# Patient Record
Sex: FEMALE | Race: WHITE | NOT HISPANIC OR LATINO | Employment: FULL TIME | URBAN - METROPOLITAN AREA
[De-identification: names, ages, dates, MRNs, and addresses within clinical notes are randomized per-mention and may not be internally consistent; named-entity substitution may affect disease eponyms.]

---

## 2017-05-25 ENCOUNTER — ALLSCRIPTS OFFICE VISIT (OUTPATIENT)
Dept: OTHER | Facility: OTHER | Age: 37
End: 2017-05-25

## 2017-05-25 DIAGNOSIS — Z12.31 ENCOUNTER FOR SCREENING MAMMOGRAM FOR MALIGNANT NEOPLASM OF BREAST: ICD-10-CM

## 2017-05-31 LAB
HPV 18 (HISTORICAL): NOT DETECTED
HPV HIGH RISK 16/18 (HISTORICAL): NOT DETECTED
HPV16 (HISTORICAL): NOT DETECTED
PAP (HISTORICAL): NORMAL

## 2018-01-13 VITALS
DIASTOLIC BLOOD PRESSURE: 88 MMHG | BODY MASS INDEX: 30.02 KG/M2 | HEIGHT: 61 IN | SYSTOLIC BLOOD PRESSURE: 126 MMHG | WEIGHT: 159 LBS

## 2018-06-07 ENCOUNTER — ANNUAL EXAM (OUTPATIENT)
Dept: OBGYN CLINIC | Facility: CLINIC | Age: 38
End: 2018-06-07
Payer: COMMERCIAL

## 2018-06-07 VITALS
WEIGHT: 160 LBS | DIASTOLIC BLOOD PRESSURE: 90 MMHG | SYSTOLIC BLOOD PRESSURE: 140 MMHG | BODY MASS INDEX: 30.21 KG/M2 | HEIGHT: 61 IN

## 2018-06-07 DIAGNOSIS — Z12.39 BREAST CANCER SCREENING: ICD-10-CM

## 2018-06-07 DIAGNOSIS — Z01.419 ENCNTR FOR GYN EXAM (GENERAL) (ROUTINE) W/O ABN FINDINGS: Primary | ICD-10-CM

## 2018-06-07 PROCEDURE — S0612 ANNUAL GYNECOLOGICAL EXAMINA: HCPCS | Performed by: NURSE PRACTITIONER

## 2018-06-07 NOTE — PROGRESS NOTES
Assessment/Plan   Diagnoses and all orders for this visit:    Encntr for gyn exam (general) (routine) w/o abn findings  -     GP Liquid-Based Pap + HPV Plus    Breast cancer screening  -     Mammo screening bilateral w cad; Future    Other orders  -     Etonogestrel (NEXPLANON) 76 MG IMPL; Inject 68 mg under the skin continuous        Discussion    Reviewed with patient normal exam today  Pap with HPV done today  Normal breast exam today  Monthly SBEs advised  Rx for mammogram given today  Vitamin D and Calcim Supplements advised  Exercise most days of the week  Follow with PCP for regular check-ups and blood work  RTO 1 year for annual or sooner as needed  Subjective     Humberto Arthur is a 42 y o  female who presents for annual well woman exam    Last exam 17 Pap Normal HPV negative had abnormal in past and colposcopy which led to insufficient tissue, but repeat pap was then normal  Pap guidelines reviewed with patient  Pt would like Pap today  Pt denies any abnormal vaginal discharge, itching, or odor  Pt in a mutually exclusive relationship with a male partner and denies the need for STD testing today  Menstrual Cycle:  LMP: 2-3 years ago  No menses since on Nexplanon  OB History      Para Term  AB Living    1       1      SAB TAB Ectopic Multiple Live Births    1           Contraception: Nexplanon placed 2016 would like another one this upcoming January  Reviewed with patient to call when closer to January and have insurance form filled out to check coverage and then can set up appointment for removal and insertion in the same day  Practices monthly SBEs, no breast complaints today  Last Mammogram Never had one  Denies any bowel or bladder issues  Pt follows with PCP for regular check-ups and blood work  Review of Systems   All other systems reviewed and are negative        The following portions of the patient's history were reviewed and updated as appropriate: allergies, current medications, past family history, past medical history, past social history, past surgical history and problem list       History reviewed  No pertinent past medical history  History reviewed  No pertinent surgical history  Family History   Problem Relation Age of Onset    Hypertension Mother     Osteopenia Mother     Hypertension Father     Hyperlipidemia Father     Lung cancer Father     Hypertension Maternal Grandmother     Hyperlipidemia Maternal Grandmother     Diabetes Maternal Grandmother     Diabetes Maternal Grandfather     Breast cancer Maternal Aunt        Social History     Social History    Marital status: Single     Spouse name: N/A    Number of children: N/A    Years of education: N/A     Occupational History    Not on file  Social History Main Topics    Smoking status: Never Smoker    Smokeless tobacco: Never Used    Alcohol use Yes      Comment: socially    Drug use: No    Sexual activity: Yes     Partners: Male     Birth control/ protection: Condom Male, Implant     Other Topics Concern    Not on file     Social History Narrative    No narrative on file         Current Outpatient Prescriptions:     Etonogestrel (NEXPLANON) 68 MG IMPL, Inject 68 mg under the skin continuous, Disp: , Rfl:     No Known Allergies    Objective   Vitals:    06/07/18 1620   BP: 140/90   BP Location: Left arm   Patient Position: Sitting   Cuff Size: Standard   Weight: 72 6 kg (160 lb)   Height: 5' 1" (1 549 m)     Physical Exam   Constitutional: She is oriented to person, place, and time  She appears well-developed and well-nourished  HENT:   Head: Normocephalic  Neck: Normal range of motion  Neck supple  No tracheal deviation present  No thyromegaly present  Cardiovascular: Normal rate, regular rhythm and normal heart sounds      Pulmonary/Chest: Effort normal and breath sounds normal  Right breast exhibits no inverted nipple, no mass, no nipple discharge, no skin change and no tenderness  Left breast exhibits no inverted nipple, no mass, no nipple discharge, no skin change and no tenderness  Breasts are symmetrical    Abdominal: Soft  Bowel sounds are normal  She exhibits no distension and no mass  There is no tenderness  There is no rebound and no guarding  Genitourinary: Vagina normal and uterus normal  No breast swelling, tenderness, discharge or bleeding  No labial fusion  There is no rash, tenderness, lesion or injury on the right labia  There is no rash, tenderness, lesion or injury on the left labia  Cervix exhibits no motion tenderness, no discharge and no friability  Right adnexum displays no mass, no tenderness and no fullness  Left adnexum displays no mass, no tenderness and no fullness  Musculoskeletal: Normal range of motion  Neurological: She is alert and oriented to person, place, and time  Skin: Skin is warm and dry  Psychiatric: She has a normal mood and affect   Her behavior is normal  Judgment and thought content normal

## 2018-06-13 LAB
HPV HR 12 DNA CVX QL NAA+PROBE: NOT DETECTED
HPV16 DNA SPEC QL NAA+PROBE: NOT DETECTED
HPV18 DNA SPEC QL NAA+PROBE: NOT DETECTED
THIN PREP CVX: NORMAL

## 2018-07-10 ENCOUNTER — TELEPHONE (OUTPATIENT)
Dept: OBGYN CLINIC | Facility: CLINIC | Age: 38
End: 2018-07-10

## 2018-08-22 ENCOUNTER — TELEPHONE (OUTPATIENT)
Dept: OBGYN CLINIC | Facility: CLINIC | Age: 38
End: 2018-08-22

## 2018-09-24 ENCOUNTER — TELEPHONE (OUTPATIENT)
Dept: OBGYN CLINIC | Facility: CLINIC | Age: 38
End: 2018-09-24

## 2018-09-24 NOTE — TELEPHONE ENCOUNTER
Has question regarding Nexplanon   Needs to have removed in January and would like a new one for replacement

## 2018-10-01 ENCOUNTER — TELEPHONE (OUTPATIENT)
Dept: OBGYN CLINIC | Facility: CLINIC | Age: 38
End: 2018-10-01

## 2018-10-04 ENCOUNTER — TRANSCRIBE ORDERS (OUTPATIENT)
Dept: ADMINISTRATIVE | Facility: HOSPITAL | Age: 38
End: 2018-10-04

## 2018-10-04 ENCOUNTER — HOSPITAL ENCOUNTER (OUTPATIENT)
Dept: RADIOLOGY | Facility: HOSPITAL | Age: 38
Discharge: HOME | End: 2018-10-04
Payer: COMMERCIAL

## 2018-10-04 DIAGNOSIS — Z12.39 BREAST CANCER SCREENING: ICD-10-CM

## 2018-10-04 PROCEDURE — 77067 SCR MAMMO BI INCL CAD: CPT

## 2018-11-06 ENCOUNTER — TELEPHONE (OUTPATIENT)
Dept: OBGYN CLINIC | Facility: CLINIC | Age: 38
End: 2018-11-06

## 2018-11-08 ENCOUNTER — TELEPHONE (OUTPATIENT)
Dept: OBGYN CLINIC | Facility: CLINIC | Age: 38
End: 2018-11-08

## 2018-11-10 ENCOUNTER — TELEPHONE (OUTPATIENT)
Dept: LABOR AND DELIVERY | Facility: HOSPITAL | Age: 38
End: 2018-11-10

## 2018-11-10 NOTE — TELEPHONE ENCOUNTER
Phone call to patient  Reviewed with patient device Nexplanon function, risks, benefits, and placement procedure  Patient reports understanding and consent with no further questions  Patient desires to proceed and will follow up at appointment  Patient has prior and will have removal and replacement

## 2018-11-16 ENCOUNTER — TELEPHONE (OUTPATIENT)
Dept: OBGYN CLINIC | Facility: CLINIC | Age: 38
End: 2018-11-16

## 2018-11-24 ENCOUNTER — OFFICE VISIT (OUTPATIENT)
Dept: FAMILY MEDICINE CLINIC | Facility: CLINIC | Age: 38
End: 2018-11-24
Payer: COMMERCIAL

## 2018-11-24 VITALS
SYSTOLIC BLOOD PRESSURE: 158 MMHG | HEIGHT: 61 IN | TEMPERATURE: 97.5 F | DIASTOLIC BLOOD PRESSURE: 98 MMHG | WEIGHT: 167 LBS | BODY MASS INDEX: 31.53 KG/M2 | HEART RATE: 96 BPM | RESPIRATION RATE: 20 BRPM

## 2018-11-24 DIAGNOSIS — H10.31 ACUTE BACTERIAL CONJUNCTIVITIS OF RIGHT EYE: ICD-10-CM

## 2018-11-24 DIAGNOSIS — J01.00 ACUTE NON-RECURRENT MAXILLARY SINUSITIS: Primary | ICD-10-CM

## 2018-11-24 PROCEDURE — 99213 OFFICE O/P EST LOW 20 MIN: CPT | Performed by: FAMILY MEDICINE

## 2018-11-24 PROCEDURE — 1036F TOBACCO NON-USER: CPT | Performed by: FAMILY MEDICINE

## 2018-11-24 PROCEDURE — 3008F BODY MASS INDEX DOCD: CPT | Performed by: FAMILY MEDICINE

## 2018-11-24 RX ORDER — CEFPODOXIME PROXETIL 100 MG/1
100 TABLET, FILM COATED ORAL 2 TIMES DAILY
Qty: 14 TABLET | Refills: 0 | Status: SHIPPED | OUTPATIENT
Start: 2018-11-24 | End: 2018-12-01

## 2018-11-24 RX ORDER — CEFPODOXIME PROXETIL 100 MG/1
100 TABLET, FILM COATED ORAL 2 TIMES DAILY
Qty: 14 TABLET | Refills: 0 | Status: SHIPPED | OUTPATIENT
Start: 2018-11-24 | End: 2018-11-24 | Stop reason: SDUPTHER

## 2018-11-24 RX ORDER — SULFACETAMIDE SODIUM 100 MG/ML
1 SOLUTION/ DROPS OPHTHALMIC
Qty: 15 ML | Refills: 0 | Status: SHIPPED | OUTPATIENT
Start: 2018-11-24 | End: 2018-12-19 | Stop reason: ALTCHOICE

## 2018-11-24 NOTE — PROGRESS NOTES
Subjective:           Problem List Items Addressed This Visit     None      Visit Diagnoses     Acute non-recurrent maxillary sinusitis    -  Primary    Relevant Medications    cefpodoxime (VANTIN) 100 mg tablet    Acute bacterial conjunctivitis of right eye        Relevant Medications    cefpodoxime (VANTIN) 100 mg tablet    sulfacetamide (BLEPH-10) 10 % ophthalmic solution              No orders of the defined types were placed in this encounter  Patient Instructions   Afrin spray 1 daily x 3    xyzal 5mg daily for congestion  f/u if worsens      Francella Lower      HPI URI sinus congestion past few days R eye red    Vitals:    11/24/18 0939   BP: 158/98   Pulse: 96   Resp: 20   Temp: 97 5 °F (36 4 °C)       No Known Allergies    Current Outpatient Prescriptions on File Prior to Visit   Medication Sig Dispense Refill    Etonogestrel (NEXPLANON) 68 MG IMPL Inject 68 mg under the skin continuous       No current facility-administered medications on file prior to visit  No past medical history on file  No past surgical history on file  reports that she has never smoked  She has never used smokeless tobacco  She reports that she drinks alcohol  She reports that she does not use drugs  reports that she has never smoked  She has never used smokeless tobacco     The following portions of the patient's history were reviewed and updated as appropriate: allergies, current medications, past family history, past medical history, past social history, past surgical history and problem list     Review of Systems   Constitutional: Negative for fatigue and fever  HENT: Positive for postnasal drip, sinus pain and sinus pressure  Negative for voice change  Eyes: Positive for redness  Respiratory: Negative for chest tightness and shortness of breath  Cardiovascular: Negative for chest pain and palpitations  Genitourinary: Negative for difficulty urinating and hematuria     Neurological: Negative for numbness  Hematological: Negative for adenopathy  Psychiatric/Behavioral: Negative for agitation and confusion  Physical Exam   Constitutional: She appears well-developed and well-nourished  HENT:   Head: Normocephalic  Mucoid sinus   Eyes: Pupils are equal, round, and reactive to light  EOM are normal  Right eye exhibits no discharge  No scleral icterus  Injected R   Neck: No thyromegaly present  Pulmonary/Chest: Effort normal and breath sounds normal    Abdominal: Soft  Bowel sounds are normal    Neurological: She is alert  No cranial nerve deficit  Skin: Skin is warm  Capillary refill takes less than 2 seconds  Psychiatric: She has a normal mood and affect

## 2018-11-28 ENCOUNTER — PROCEDURE VISIT (OUTPATIENT)
Dept: OBGYN CLINIC | Facility: CLINIC | Age: 38
End: 2018-11-28
Payer: COMMERCIAL

## 2018-11-28 VITALS
HEIGHT: 61 IN | SYSTOLIC BLOOD PRESSURE: 142 MMHG | WEIGHT: 162 LBS | BODY MASS INDEX: 30.58 KG/M2 | DIASTOLIC BLOOD PRESSURE: 98 MMHG

## 2018-11-28 DIAGNOSIS — Z30.017 NEXPLANON INSERTION: Primary | ICD-10-CM

## 2018-11-28 DIAGNOSIS — Z30.46 NEXPLANON REMOVAL: ICD-10-CM

## 2018-11-28 DIAGNOSIS — Z30.017 ENCOUNTER FOR INITIAL PRESCRIPTION OF IMPLANTABLE SUBDERMAL CONTRACEPTIVE: ICD-10-CM

## 2018-11-28 LAB — SL AMB POCT URINE HCG: NEGATIVE

## 2018-11-28 PROCEDURE — 11983 REMOVE/INSERT DRUG IMPLANT: CPT | Performed by: NURSE PRACTITIONER

## 2018-11-28 PROCEDURE — 81025 URINE PREGNANCY TEST: CPT | Performed by: NURSE PRACTITIONER

## 2018-11-29 NOTE — PROGRESS NOTES
Remove and insert drug implant  Date/Time: 11/28/2018 10:04 PM  Performed by: Haider Voss  Authorized by: Haider Voss     Consent:     Consent obtained:  Verbal and written    Consent given by:  Patient    Procedural risks discussed:  Bleeding, damage to other organs, failure rate, infection, possible continued pain, possible conversion to open surgery, possible loss of function and repeat procedure    Patient questions answered: yes      Patient agrees, verbalizes understanding, and wants to proceed: yes      Educational handouts given: yes      Instructions and paperwork completed: yes    Indication:     Indication: Presence of non-biodegradable drug delivery implant    Pre-procedure:     Pre-procedure timeout performed: yes      Prepped with: povidone-iodine      Local anesthetic:  Lidocaine 1%    The site was cleaned and prepped in a sterile fashion: yes    Procedure:     Procedure:  Removal with reinsertion    Small stab incision was made in arm: yes      Left/right:  Right    Preloaded Implanon trocar was placed subdermally: yes      Visualization of implant was obtained: yes      Implanon was inserted and trocar removed: yes      Visualization of notch in stilette and palpitation of device: yes      Palpitation confirms placement by provider and patient: yes      Site was closed with steri-strips and pressure bandage applied: yes    Comments:      Old nexplanon removed intact without difficulty  New Nexplanon placed in right arm without difficulty  No heavy lifting with right arm for 24-48 hours  May remove bandage after 24 hours  Call with any questions

## 2018-12-19 ENCOUNTER — OFFICE VISIT (OUTPATIENT)
Dept: FAMILY MEDICINE CLINIC | Facility: CLINIC | Age: 38
End: 2018-12-19
Payer: COMMERCIAL

## 2018-12-19 ENCOUNTER — TELEPHONE (OUTPATIENT)
Dept: FAMILY MEDICINE CLINIC | Facility: CLINIC | Age: 38
End: 2018-12-19

## 2018-12-19 VITALS
RESPIRATION RATE: 16 BRPM | BODY MASS INDEX: 31.53 KG/M2 | DIASTOLIC BLOOD PRESSURE: 90 MMHG | HEART RATE: 76 BPM | HEIGHT: 61 IN | WEIGHT: 167 LBS | SYSTOLIC BLOOD PRESSURE: 140 MMHG | TEMPERATURE: 98.8 F

## 2018-12-19 DIAGNOSIS — H10.32 ACUTE CONJUNCTIVITIS OF LEFT EYE, UNSPECIFIED ACUTE CONJUNCTIVITIS TYPE: Primary | ICD-10-CM

## 2018-12-19 PROCEDURE — 99213 OFFICE O/P EST LOW 20 MIN: CPT | Performed by: NURSE PRACTITIONER

## 2018-12-19 PROCEDURE — 1036F TOBACCO NON-USER: CPT | Performed by: NURSE PRACTITIONER

## 2018-12-19 PROCEDURE — 3008F BODY MASS INDEX DOCD: CPT | Performed by: NURSE PRACTITIONER

## 2018-12-19 RX ORDER — TOBRAMYCIN AND DEXAMETHASONE 3; 1 MG/ML; MG/ML
1 SUSPENSION/ DROPS OPHTHALMIC
Qty: 10 ML | Refills: 0 | Status: SHIPPED | OUTPATIENT
Start: 2018-12-19 | End: 2020-11-25 | Stop reason: ALTCHOICE

## 2018-12-19 RX ORDER — DEXAMETHASONE SODIUM PHOSPHATE 1 MG/ML
1 SOLUTION/ DROPS OPHTHALMIC
Qty: 5 ML | Refills: 0 | Status: CANCELLED | OUTPATIENT
Start: 2018-12-19

## 2018-12-19 NOTE — TELEPHONE ENCOUNTER
As long as she does 1 drop every 4 hrs the total amount given is not important    She should treat the eye for 7 days

## 2018-12-19 NOTE — PROGRESS NOTES
Assessment/Plan:     Diagnoses and all orders for this visit:    Acute conjunctivitis of left eye, unspecified acute conjunctivitis type  -     tobramycin-dexamethasone (TOBRADEX) ophthalmic suspension; Administer 1 drop into the left eye every 4 (four) hours while awake    BMI 31 0-31 9,adult    Other orders  -     Cancel: dexamethasone sodium phosphate 0 1 % ophthalmic solution; 1 drop every 3 (three) hours            Patient Instructions:  Warm compresses at the eye  Supportive care discussed and advised  Follow up for no improvement and worsening of conditions  Patient advised and educated when to see immediate medical care  Return if symptoms worsen or fail to improve  Subjective:      Patient ID: Nazia Simons is a 34 y o  female  Chief Complaint   Patient presents with   [id number] [facility]       HPI  Patient stated that started with redness in left eye with yellowish discharge in the morning  Denies fever,chills and vision changes  Taking zyrtec for congestion and feeling better  The following portions of the patient's history were reviewed and updated as appropriate: allergies, current medications, past family history, past medical history, past social history, past surgical history and problem list       Review of Systems   Constitutional: Negative for chills, fatigue and fever  HENT: Negative for congestion, ear discharge, ear pain, facial swelling, hearing loss, mouth sores, nosebleeds, postnasal drip, rhinorrhea, sinus pain, sinus pressure, sneezing, sore throat, trouble swallowing and voice change  Eyes: Positive for discharge and redness  Negative for pain, itching and visual disturbance  Respiratory: Negative for cough, chest tightness, shortness of breath and wheezing  Cardiovascular: Negative  Gastrointestinal: Negative for abdominal pain, constipation, diarrhea and nausea  Neurological: Negative for dizziness, weakness, light-headedness and headaches  Objective:    History   Smoking Status    Never Smoker   Smokeless Tobacco    Never Used       Allergies: No Known Allergies    Vitals:  /90   Pulse 76   Temp 98 8 °F (37 1 °C)   Resp 16   Ht 5' 1" (1 549 m)   Wt 75 8 kg (167 lb)   BMI 31 55 kg/m²     Current Outpatient Prescriptions   Medication Sig Dispense Refill    tobramycin-dexamethasone (TOBRADEX) ophthalmic suspension Administer 1 drop into the left eye every 4 (four) hours while awake 10 mL 0     No current facility-administered medications for this visit  Physical Exam   Constitutional: She is oriented to person, place, and time  She appears well-developed and well-nourished  HENT:   Head: Normocephalic  Right Ear: Tympanic membrane, external ear and ear canal normal    Left Ear: Tympanic membrane, external ear and ear canal normal    Nose: Nose normal  Right sinus exhibits no maxillary sinus tenderness and no frontal sinus tenderness  Left sinus exhibits no maxillary sinus tenderness and no frontal sinus tenderness  Mouth/Throat: Oropharynx is clear and moist and mucous membranes are normal    Eyes: Right conjunctiva is not injected  Left conjunctiva is injected  Neck: Neck supple  Cardiovascular: Normal rate, regular rhythm and normal heart sounds  Pulmonary/Chest: Effort normal and breath sounds normal    Abdominal: Normal appearance and bowel sounds are normal  There is no hepatosplenomegaly  There is no tenderness  There is no rebound  Musculoskeletal: Normal range of motion  Lymphadenopathy:        Right cervical: No superficial cervical and no posterior cervical adenopathy present  Left cervical: No superficial cervical and no posterior cervical adenopathy present  Neurological: She is alert and oriented to person, place, and time  Skin: Skin is warm and dry  Psychiatric: She has a normal mood and affect  Her behavior is normal  Judgment and thought content normal    Vitals reviewed  TALAT CarcamoNP

## 2018-12-19 NOTE — TELEPHONE ENCOUNTER
Patient  called back and stated that her and pharmacist found cupon for 5ml of TOBERDEX for $52     Can she take this amount? She said she was never told how much to take  Please call she is at Pharmacy    [phone]

## 2018-12-19 NOTE — TELEPHONE ENCOUNTER
Spoke with pt, she is aware and agreeable  No further action needed   Melvina Patel, [address]

## 2018-12-19 NOTE — TELEPHONE ENCOUNTER
Pt was here earlier and was told to call back if the prescription she sent to the pharmacy was too expensive and it is  It was $218, she also said that she was told someone else here tonight can recommend something cheaper and call it over tonight  To the [address] [place] [address] in [place]  Also please call the pt  To let her know

## 2018-12-19 NOTE — PATIENT INSTRUCTIONS
Warm compresses at the eye  Supportive care discussed and advised  Follow up for no improvement and worsening of conditions  Patient advised and educated when to see immediate medical care  Conjunctivitis   AMBULATORY CARE:   Conjunctivitis,  or pink eye, is inflammation of your conjunctiva  The conjunctiva is a thin tissue that covers the front of your eye and the back of your eyelids  The conjunctiva helps protect your eye and keep it moist  Conjunctivitis may be caused by bacteria, allergies, or a virus  If your conjunctivitis is caused by bacteria, it may get better on its own in about 7 days  Viral conjunctivitis can last up to 3 weeks  Common symptoms may include any of the following: You will usually have symptoms in both eyes if your conjunctivitis is caused by allergies  You may also have other allergic symptoms, such as a rash or runny nose  Symptoms will usually start in 1 eye if your conjunctivitis is caused by a virus or bacteria  · Redness in the whites of your eye    · Itching in your eye or around your eye    · Feeling like there is something in your eye    · Watery or thick, sticky discharge    · Crusty eyelids when you wake up in the morning    · Burning, stinging, or swelling in your eye    · Pain when you see bright light  Seek care immediately if:   · You have worsening eye pain  · The swelling in your eye gets worse, even after treatment  · Your vision suddenly becomes worse or you cannot see at all  Contact your healthcare provider if:   · You develop a fever and ear pain  · You have tiny bumps or spots of blood on your eye  · You have questions or concerns about your condition or care  Treatment  will depend on the cause of your conjunctivitis  You may need antibiotics or allergy medicine as a pill, eye drop, or eye ointment  Manage your symptoms:   · Apply a cool compress  Wet a washcloth with cold water and place it on your eye   This will help decrease itching and irritation  · Do not wear contact lenses  They can irritate your eye  Throw away the pair you are using and ask when you can wear them again  Use a new pair of lenses when your healthcare provider says it is okay  · Avoid irritants  Stay away from smoke filled areas  Shield your eyes from wind and sun  · Flush your eye  You may need to flush your eye with saline to help decrease your symptoms  Ask for more information on how to flush your eye  Medicines:  Treatment depends on what is causing your conjunctivitis  You may be given any of the following:  · Allergy medicine  helps decrease itchy, red, swollen eyes caused by allergies  It may be given as a pill, eye drops, or nasal spray  · Antibiotics  may be needed if your conjunctivitis is caused by bacteria  This medicine may be given as a pill, eye drops, or eye ointment  · Take your medicine as directed  Contact your healthcare provider if you think your medicine is not helping or if you have side effects  Tell him or her if you are allergic to any medicine  Keep a list of the medicines, vitamins, and herbs you take  Include the amounts, and when and why you take them  Bring the list or the pill bottles to follow-up visits  Carry your medicine list with you in case of an emergency  Prevent the spread of conjunctivitis:   · Wash your hands with soap and water often  Wash your hands before and after you touch your eyes  Also wash your hands before you prepare or eat food and after you use the bathroom or change a diaper  · Avoid allergens  Try to avoid the things that cause your allergies, such as pets, dust, or grass  · Avoid contact with others  Do not share towels or washcloths  Try to stay away from others as much as possible  Ask when you can return to work or school  · Throw away eye makeup  The bacteria that caused your conjunctivitis can stay in eye makeup  Throw away mascara and other eye makeup    © 2017 [facility] Information is for End User's use only and may not be sold, redistributed or otherwise used for commercial purposes  All illustrations and images included in CareNotes® are the copyrighted property of [employer] D [employer] M [employer]  or [employer]  The above information is an  only  It is not intended as medical advice for individual conditions or treatments  Talk to your doctor, nurse or pharmacist before following any medical regimen to see if it is safe and effective for you

## 2019-06-27 ENCOUNTER — ANNUAL EXAM (OUTPATIENT)
Dept: OBGYN CLINIC | Facility: CLINIC | Age: 39
End: 2019-06-27
Payer: COMMERCIAL

## 2019-06-27 VITALS
BODY MASS INDEX: 30.85 KG/M2 | SYSTOLIC BLOOD PRESSURE: 142 MMHG | HEIGHT: 61 IN | WEIGHT: 163.4 LBS | DIASTOLIC BLOOD PRESSURE: 102 MMHG

## 2019-06-27 DIAGNOSIS — Z01.419 ENCNTR FOR GYN EXAM (GENERAL) (ROUTINE) W/O ABN FINDINGS: Primary | ICD-10-CM

## 2019-06-27 DIAGNOSIS — Z12.39 BREAST CANCER SCREENING: ICD-10-CM

## 2019-06-27 PROCEDURE — S0612 ANNUAL GYNECOLOGICAL EXAMINA: HCPCS | Performed by: NURSE PRACTITIONER

## 2019-07-04 LAB — THIN PREP CVX: NORMAL

## 2019-10-10 LAB — HBA1C MFR BLD HPLC: 5.8 %

## 2019-11-13 ENCOUNTER — HOSPITAL ENCOUNTER (OUTPATIENT)
Dept: RADIOLOGY | Facility: HOSPITAL | Age: 39
Discharge: HOME | End: 2019-11-13
Payer: COMMERCIAL

## 2019-11-13 VITALS — HEIGHT: 61 IN | WEIGHT: 165 LBS | BODY MASS INDEX: 31.15 KG/M2

## 2019-11-13 DIAGNOSIS — Z12.39 BREAST CANCER SCREENING: ICD-10-CM

## 2019-11-13 PROCEDURE — 77067 SCR MAMMO BI INCL CAD: CPT

## 2019-11-13 PROCEDURE — 77063 BREAST TOMOSYNTHESIS BI: CPT

## 2020-11-25 ENCOUNTER — ANNUAL EXAM (OUTPATIENT)
Dept: OBGYN CLINIC | Facility: CLINIC | Age: 40
End: 2020-11-25
Payer: COMMERCIAL

## 2020-11-25 VITALS
WEIGHT: 176 LBS | SYSTOLIC BLOOD PRESSURE: 120 MMHG | DIASTOLIC BLOOD PRESSURE: 70 MMHG | BODY MASS INDEX: 33.25 KG/M2 | TEMPERATURE: 97.5 F

## 2020-11-25 DIAGNOSIS — Z12.31 ENCOUNTER FOR SCREENING MAMMOGRAM FOR MALIGNANT NEOPLASM OF BREAST: ICD-10-CM

## 2020-11-25 DIAGNOSIS — Z01.419 WELL WOMAN EXAM: Primary | ICD-10-CM

## 2020-11-25 PROCEDURE — S0612 ANNUAL GYNECOLOGICAL EXAMINA: HCPCS | Performed by: PHYSICIAN ASSISTANT

## 2020-11-25 PROCEDURE — G0145 SCR C/V CYTO,THINLAYER,RESCR: HCPCS | Performed by: PHYSICIAN ASSISTANT

## 2020-11-30 LAB
LAB AP GYN PRIMARY INTERPRETATION: NORMAL
Lab: NORMAL

## 2021-01-27 ENCOUNTER — HOSPITAL ENCOUNTER (OUTPATIENT)
Dept: RADIOLOGY | Facility: HOSPITAL | Age: 41
Discharge: HOME | End: 2021-01-27
Payer: COMMERCIAL

## 2021-01-27 VITALS — HEIGHT: 61 IN | WEIGHT: 162 LBS | BODY MASS INDEX: 30.58 KG/M2

## 2021-01-27 DIAGNOSIS — Z12.31 ENCOUNTER FOR SCREENING MAMMOGRAM FOR MALIGNANT NEOPLASM OF BREAST: ICD-10-CM

## 2021-01-27 PROCEDURE — 77063 BREAST TOMOSYNTHESIS BI: CPT

## 2021-01-27 PROCEDURE — 77067 SCR MAMMO BI INCL CAD: CPT

## 2021-05-19 ENCOUNTER — TELEPHONE (OUTPATIENT)
Dept: FAMILY MEDICINE CLINIC | Facility: CLINIC | Age: 41
End: 2021-05-19

## 2021-05-19 NOTE — TELEPHONE ENCOUNTER
Patient has not been seen since 2018  Please verify pcp  She will need a CPE   [address], [place]

## 2021-05-25 ENCOUNTER — OFFICE VISIT (OUTPATIENT)
Dept: FAMILY MEDICINE CLINIC | Facility: CLINIC | Age: 41
End: 2021-05-25
Payer: COMMERCIAL

## 2021-05-25 VITALS
HEART RATE: 74 BPM | SYSTOLIC BLOOD PRESSURE: 146 MMHG | TEMPERATURE: 99.3 F | HEIGHT: 61 IN | DIASTOLIC BLOOD PRESSURE: 96 MMHG | RESPIRATION RATE: 16 BRPM | WEIGHT: 165 LBS | BODY MASS INDEX: 31.15 KG/M2

## 2021-05-25 DIAGNOSIS — R03.0 ELEVATED BLOOD PRESSURE READING WITHOUT DIAGNOSIS OF HYPERTENSION: ICD-10-CM

## 2021-05-25 DIAGNOSIS — Z00.00 ROUTINE ADULT HEALTH MAINTENANCE: Primary | ICD-10-CM

## 2021-05-25 DIAGNOSIS — Z13.0 SCREENING FOR DEFICIENCY ANEMIA: ICD-10-CM

## 2021-05-25 DIAGNOSIS — Z23 NEED FOR VACCINATION: ICD-10-CM

## 2021-05-25 DIAGNOSIS — Z13.1 SCREENING FOR DIABETES MELLITUS: ICD-10-CM

## 2021-05-25 DIAGNOSIS — Z13.29 SCREENING FOR THYROID DISORDER: ICD-10-CM

## 2021-05-25 DIAGNOSIS — Z13.6 SCREENING FOR CARDIOVASCULAR CONDITION: ICD-10-CM

## 2021-05-25 PROCEDURE — 99396 PREV VISIT EST AGE 40-64: CPT | Performed by: NURSE PRACTITIONER

## 2021-05-25 PROCEDURE — 90715 TDAP VACCINE 7 YRS/> IM: CPT

## 2021-05-25 PROCEDURE — 3725F SCREEN DEPRESSION PERFORMED: CPT | Performed by: NURSE PRACTITIONER

## 2021-05-25 PROCEDURE — 90471 IMMUNIZATION ADMIN: CPT

## 2021-05-25 NOTE — PROGRESS NOTES
FAMILY PRACTICE HEALTH MAINTENANCE OFFICE VISIT  [facility]    NAME: Consuelo Meza  AGE: 41 y o  SEX: female  : 1980     DATE: 2021    Assessment and Plan     1  Routine adult health maintenance    2  Screening for cardiovascular condition  -     Lipid Panel with Direct LDL reflex; Future    3  Screening for diabetes mellitus  -     Comprehensive metabolic panel; Future    4  Screening for deficiency anemia  -     CBC; Future    5  Screening for thyroid disorder  -     TSH, 3rd generation with Free T4 reflex; Future    6  Need for vaccination  -     TDAP VACCINE GREATER THAN OR EQUAL TO 8YO IM    7  Elevated blood pressure reading without diagnosis of hypertension  Advised to check BP at home twice a day and will bring readings in office in 3 weeks at follow up and can come early if needed for any issues  · Patient Counseling:   · Nutrition: Stressed importance of a well balanced diet, moderation of sodium/saturated fat, caloric balance and sufficient intake of fiber  · Exercise: Stressed the importance of regular exercise with a goal of 150 minutes per week  · Dental Health: Discussed daily flossing and brushing and regular dental visits   · Sexuality: Discussed sexually transmitted infections, use of condoms and prevention of unintended pregnancy  · Alcohol Use:  Recommended moderation of alcohol intake  · Injury Prevention: Discussed Safety Belts, Safety Helmets, and Smoke Detectors    · Immunizations reviewed: See Orders  · Discussed benefits of:  Mammogram , Cervical Cancer screening and Screening labs   BMI Counseling: Body mass index is 31 18 kg/m²  Discussed with patient's BMI with her   The BMI is above normal  Nutrition recommendations include reducing portion sizes, decreasing overall calorie intake, 3-5 servings of fruits/vegetables daily, reducing fast food intake, consuming healthier snacks, decreasing soda and/or juice intake, moderation in carbohydrate intake, increasing intake of lean protein, reducing intake of saturated fat and trans fat and reducing intake of cholesterol  Exercise recommendations include exercising 3-5 times per week, joining a gym and strength training exercises  Return in about 1 year (around 5/25/2022) for Annual physical         Chief Complaint     Chief Complaint   Patient presents with    Physical Exam     [facility]       History of Present Illness     HPI    Well Adult Physical   Patient here for a comprehensive physical exam   Denies any concerns and complains  Under care of gynecologist and uptodate on pap, breast exam and mammogram    BP reading elevated in office but denies any h/o HTN and stated that in last couple of years at her screening at work, they told her that her BP is elevated as SBP is been 130's and DBP is in 90's but whenever got rechecked, it was normal  Denies any headache, dizziness and sob  Advised to check BP at home twice a day and will bring readings in office in 3 weeks at follow up and can come early if needed for any issues  Stated that one of maternal aunts had colon cancer in her 63's but otherwise no other history   Will start colonscopy at 46 and if insurance covers before at age of 37 the will start then        Diet and Physical Activity  Diet: well balanced diet  Exercise: occasionally      Depression Screen  PHQ-9 Depression Screening    PHQ-9:   Frequency of the following problems over the past two weeks:      Little interest or pleasure in doing things: 0 - not at all  Feeling down, depressed, or hopeless: 0 - not at all  PHQ-2 Score: 0          General Health  Hearing: Normal:  bilateral  Vision: no vision problems, most recent eye exam <1 year and wears glasses  Dental: regular dental visits    Reproductive Health  Follows with gynecologist      The following portions of the patient's history were reviewed and updated as appropriate: allergies, current medications, past family history, past medical history, past social history, past surgical history and problem list     Review of Systems     Review of Systems   Constitutional: Negative  HENT: Negative  Eyes: Negative  Respiratory: Negative  Cardiovascular: Negative  Gastrointestinal: Negative  Endocrine: Negative  Genitourinary: Negative  Musculoskeletal: Negative  Skin: Negative  Allergic/Immunologic: Negative  Neurological: Negative  Hematological: Negative  Psychiatric/Behavioral: Negative  Past Medical History     Past Medical History:   Diagnosis Date    Abnormal Pap smear of cervix 2017    Miscarriage 10/2013    Urogenital trichomoniasis 4/2014       Past Surgical History     History reviewed  No pertinent surgical history  Social History     Social History     Socioeconomic History    Marital status: Single     Spouse name: None    Number of children: None    Years of education: None    Highest education level: None   Occupational History    None   Social Needs    Financial resource strain: None    Food insecurity     Worry: None     Inability: None    Transportation needs     Medical: None     Non-medical: None   Tobacco Use    Smoking status: Never Smoker    Smokeless tobacco: Never Used   Substance and Sexual Activity    Alcohol use:  Yes     Alcohol/week: 1 0 standard drinks     Types: 1 Glasses of wine per week     Comment: socially    Drug use: No    Sexual activity: Yes     Partners: Male     Birth control/protection: Condom Male, Implant   Lifestyle    Physical activity     Days per week: None     Minutes per session: None    Stress: None   Relationships    Social connections     Talks on phone: None     Gets together: None     Attends Muslim service: None     Active member of club or organization: None     Attends meetings of clubs or organizations: None     Relationship status: None    Intimate partner violence     Fear of current or ex partner: None Emotionally abused: None     Physically abused: None     Forced sexual activity: None   Other Topics Concern    None   Social History Narrative    None       Family History     Family History   Problem Relation Age of Onset    Hypertension Mother     Osteopenia Mother     Hypertension Father     Hyperlipidemia Father     Lung cancer Father     Hypertension Maternal Grandmother     Hyperlipidemia Maternal Grandmother     Diabetes Maternal Grandmother     Diabetes Maternal Grandfather     Breast cancer Maternal Aunt 45    Hypertension Sister     No Known Problems Paternal Grandmother     Hypertension Sister     Colon cancer Maternal Aunt 61    No Known Problems Maternal Aunt     No Known Problems Maternal Aunt     No Known Problems Paternal Aunt     Diabetes Paternal Grandfather     Hyperlipidemia Paternal Grandfather        Current Medications       Current Outpatient Medications:     etonogestrel (NEXPLANON) subdermal implant, 68 mg by Subdermal route once, Disp: , Rfl:      Allergies     No Known Allergies    Objective     /96   Pulse 74   Temp 99 3 °F (37 4 °C)   Resp 16   Ht 5' 1" (1 549 m)   Wt 74 8 kg (165 lb)   BMI 31 18 kg/m²      Physical Exam  Vitals signs reviewed  Constitutional:       Appearance: Normal appearance  HENT:      Head: Normocephalic and atraumatic  Salivary Glands: Right salivary gland is not tender  Left salivary gland is not tender  Right Ear: Tympanic membrane, ear canal and external ear normal       Left Ear: Tympanic membrane, ear canal and external ear normal  There is no impacted cerumen  Nose: Nose normal  No congestion  Mouth/Throat:      Mouth: Mucous membranes are moist       Pharynx: No oropharyngeal exudate or posterior oropharyngeal erythema  Eyes:      General: Lids are normal          Right eye: No discharge or hordeolum  Left eye: No discharge or hordeolum        Conjunctiva/sclera: Conjunctivae normal  Right eye: Right conjunctiva is not injected  No exudate or hemorrhage  Left eye: Left conjunctiva is not injected  No exudate or hemorrhage  Pupils: Pupils are equal, round, and reactive to light  Neck:      Musculoskeletal: Full passive range of motion without pain, normal range of motion and neck supple  Thyroid: No thyromegaly or thyroid tenderness  Cardiovascular:      Rate and Rhythm: Normal rate and regular rhythm  Pulses: Normal pulses  Heart sounds: Normal heart sounds  Pulmonary:      Effort: Pulmonary effort is normal       Breath sounds: Normal breath sounds  Chest:      Chest wall: No deformity, swelling, tenderness, crepitus or edema  There is no dullness to percussion  Abdominal:      General: Abdomen is flat  Bowel sounds are normal       Palpations: Abdomen is soft  Tenderness: There is no abdominal tenderness  Hernia: There is no hernia in the left inguinal area or right inguinal area  Genitourinary:     Comments:  and breast deferred as follows up with gynecologist  Musculoskeletal: Normal range of motion  General: No swelling or tenderness  Right lower leg: No edema  Left lower leg: No edema  Lymphadenopathy:      Cervical:      Right cervical: No superficial or posterior cervical adenopathy  Left cervical: No superficial or posterior cervical adenopathy  Upper Body:      Right upper body: No supraclavicular or axillary adenopathy  Left upper body: No supraclavicular or axillary adenopathy  Lower Body: No right inguinal adenopathy  No left inguinal adenopathy  Skin:     General: Skin is warm and dry  Findings: No rash  Neurological:      General: No focal deficit present  Mental Status: She is alert and oriented to person, place, and time  Mental status is at baseline  GCS: GCS eye subscore is 4  GCS verbal subscore is 5  Motor: Motor function is intact        Coordination: Coordination is intact  Coordination normal       Deep Tendon Reflexes: Reflexes are normal and symmetric  Psychiatric:         Attention and Perception: Attention normal          Mood and Affect: Mood normal          Speech: Speech normal          Behavior: Behavior normal  Behavior is cooperative  Thought Content:  Thought content normal          Judgment: Judgment normal             Visual Acuity Screening    Right eye Left eye Both eyes   Without correction:      With correction: 20/20 20/15 20/15           [address]

## 2021-05-25 NOTE — PATIENT INSTRUCTIONS
Wellness Visit for Adults   WHAT YOU NEED TO KNOW:   What is a wellness visit? A wellness visit is when you see your healthcare provider to get screened for health problems  Your healthcare provider will also give you advice on how to stay healthy  Write down your questions so you remember to ask them  Ask your healthcare provider how often you should have a wellness visit  What happens at a wellness visit? Your healthcare provider will ask about your health, and your family history of health problems  This includes high blood pressure, heart disease, and cancer  He or she will ask if you have symptoms that concern you, if you smoke, and about your mood  You may also be asked about your intake of medicines, supplements, food, and alcohol  Any of the following may be done:  · Your weight  will be checked  Your height may also be checked so your body mass index (BMI) can be calculated  Your BMI shows if you are at a healthy weight  · Your blood pressure  and heart rate will be checked  Your temperature may also be checked  · Blood and urine tests  may be done  Blood tests may be done to check your cholesterol levels  Abnormal cholesterol levels increase your risk for heart disease and stroke  You may also need a blood or urine test to check for diabetes if you are at increased risk  Urine tests may be done to look for signs of an infection or kidney disease  · A physical exam  includes checking your heartbeat and lungs with a stethoscope  Your healthcare provider may also check your skin to look for sun damage  · Screening tests  may be recommended  A screening test is done to check for diseases that may not cause symptoms  The screening tests you may need depend on your age, gender, family history, and lifestyle habits  For example, colorectal screening may be recommended if you are 48years old or older  What screening tests do I need if I am a woman?    · A Pap smear  is used to screen for cervical cancer  Pap smears are usually done every 3 to 5 years depending on your age  You may need them more often if you have had abnormal Pap smear test results in the past  Ask your healthcare provider how often you should have a Pap smear  · A mammogram  is an x-ray of your breasts to screen for breast cancer  Experts recommend mammograms every 2 years starting at age 48 years  You may need a mammogram at age 52 years or younger if you have an increased risk for breast cancer  Talk to your healthcare provider about when you should start having mammograms and how often you need them  What vaccines might I need? · Get an influenza vaccine  every year  The influenza vaccine protects you from the flu  Several types of viruses cause the flu  The viruses change over time, so new vaccines are made each year  · Get a tetanus-diphtheria (Td) booster vaccine  every 10 years  This vaccine protects you against tetanus and diphtheria  Tetanus is a severe infection that may cause painful muscle spasms and lockjaw  Diphtheria is a severe bacterial infection that causes a thick covering in the back of your mouth and throat  · Get a human papillomavirus (HPV) vaccine  if you are female and aged 23 to 32 or male 23 to 24 and never received it  This vaccine protects you from HPV infection  HPV is the most common infection spread by sexual contact  HPV may also cause vaginal, penile, and anal cancers  · Get a pneumococcal vaccine  if you are aged 72 years or older  The pneumococcal vaccine is an injection given to protect you from pneumococcal disease  Pneumococcal disease is an infection caused by pneumococcal bacteria  The infection may cause pneumonia, meningitis, or an ear infection  · Get a shingles vaccine  if you are 60 or older, even if you have had shingles before  The shingles vaccine is an injection to protect you from the varicella-zoster virus  This is the same virus that causes chickenpox   Shingles is a painful rash that develops in people who had chickenpox or have been exposed to the virus  How can I eat healthy? My Plate is a model for planning healthy meals  It shows the types and amounts of foods that should go on your plate  Fruits and vegetables make up about half of your plate, and grains and protein make up the other half  A serving of dairy is included on the side of your plate  The amount of calories and serving sizes you need depends on your age, gender, weight, and height  Examples of healthy foods are listed below:  · Eat a variety of vegetables  such as dark green, red, and orange vegetables  You can also include canned vegetables low in sodium (salt) and frozen vegetables without added butter or sauces  · Eat a variety of fresh fruits , canned fruit in 100% juice, frozen fruit, and dried fruit  · Include whole grains  At least half of the grains you eat should be whole grains  Examples include whole-wheat bread, wheat pasta, brown rice, and whole-grain cereals such as oatmeal     · Eat a variety of protein foods such as seafood (fish and shellfish), lean meat, and poultry without skin (turkey and chicken)  Examples of lean meats include pork leg, shoulder, or tenderloin, and beef round, sirloin, tenderloin, and extra lean ground beef  Other protein foods include eggs and egg substitutes, beans, peas, soy products, nuts, and seeds  · Choose low-fat dairy products such as skim or 1% milk or low-fat yogurt, cheese, and cottage cheese  · Limit unhealthy fats  such as butter, hard margarine, and shortening  How much exercise do I need? Exercise at least 30 minutes per day on most days of the week  Some examples of exercise include walking, biking, dancing, and swimming  You can also fit in more physical activity by taking the stairs instead of the elevator or parking farther away from stores  Include muscle strengthening activities 2 days each week   Regular exercise provides many health benefits  It helps you manage your weight, and decreases your risk for type 2 diabetes, heart disease, stroke, and high blood pressure  Exercise can also help improve your mood  Ask your healthcare provider about the best exercise plan for you  What are some general health and safety guidelines I should follow? · Do not smoke  Nicotine and other chemicals in cigarettes and cigars can cause lung damage  Ask your healthcare provider for information if you currently smoke and need help to quit  E-cigarettes or smokeless tobacco still contain nicotine  Talk to your healthcare provider before you use these products  · Limit alcohol  A drink of alcohol is 12 ounces of beer, 5 ounces of wine, or 1½ ounces of liquor  · Lose weight, if needed  Being overweight increases your risk of certain health conditions  These include heart disease, high blood pressure, type 2 diabetes, and certain types of cancer  · Protect your skin  Do not sunbathe or use tanning beds  Use sunscreen with a SPF 15 or higher  Apply sunscreen at least 15 minutes before you go outside  Reapply sunscreen every 2 hours  Wear protective clothing, hats, and sunglasses when you are outside  · Drive safely  Always wear your seatbelt  Make sure everyone in your car wears a seatbelt  A seatbelt can save your life if you are in an accident  Do not use your cell phone when you are driving  This could distract you and cause an accident  Pull over if you need to make a call or send a text message  · Practice safe sex  Use latex condoms if are sexually active and have more than one partner  Your healthcare provider may recommend screening tests for sexually transmitted infections (STIs)  · Wear helmets, lifejackets, and protective gear  Always wear a helmet when you ride a bike or motorcycle, go skiing, or play sports that could cause a head injury  Wear protective equipment when you play sports   Wear a lifejacket when you are on a boat or doing water sports  CARE AGREEMENT:   You have the right to help plan your care  Learn about your health condition and how it may be treated  Discuss treatment options with your healthcare providers to decide what care you want to receive  You always have the right to refuse treatment  The above information is an  only  It is not intended as medical advice for individual conditions or treatments  Talk to your doctor, nurse or pharmacist before following any medical regimen to see if it is safe and effective for you  © Copyright [address] Information is for End User's use only and may not be sold, redistributed or otherwise used for commercial purposes   All illustrations and images included in CareNotes® are the copyrighted property of [employer]  or [address] [place] [address]

## 2021-06-16 ENCOUNTER — OFFICE VISIT (OUTPATIENT)
Dept: FAMILY MEDICINE CLINIC | Facility: CLINIC | Age: 41
End: 2021-06-16
Payer: COMMERCIAL

## 2021-06-16 VITALS
RESPIRATION RATE: 14 BRPM | OXYGEN SATURATION: 96 % | TEMPERATURE: 98.6 F | HEART RATE: 111 BPM | HEIGHT: 61 IN | SYSTOLIC BLOOD PRESSURE: 150 MMHG | WEIGHT: 166 LBS | DIASTOLIC BLOOD PRESSURE: 100 MMHG | BODY MASS INDEX: 31.34 KG/M2

## 2021-06-16 DIAGNOSIS — I10 ESSENTIAL HYPERTENSION: Primary | ICD-10-CM

## 2021-06-16 PROCEDURE — 99213 OFFICE O/P EST LOW 20 MIN: CPT | Performed by: NURSE PRACTITIONER

## 2021-06-16 PROCEDURE — 3008F BODY MASS INDEX DOCD: CPT | Performed by: NURSE PRACTITIONER

## 2021-06-16 PROCEDURE — 1036F TOBACCO NON-USER: CPT | Performed by: NURSE PRACTITIONER

## 2021-06-16 NOTE — PATIENT INSTRUCTIONS
Weight Management   WHAT YOU NEED TO KNOW:   Being overweight increases your risk of health conditions such as heart disease, high blood pressure, type 2 diabetes, and certain types of cancer  It can also increase your risk for osteoarthritis, sleep apnea, and other respiratory problems  Aim for a slow, steady weight loss  Even a small amount of weight loss can lower your risk of health problems  DISCHARGE INSTRUCTIONS:   How to lose weight safely:  A safe and healthy way to lose weight is to eat fewer calories and get regular exercise  · You can lose up about 1 pound a week by decreasing the number of calories you eat by 500 calories each day  You can decrease calories by eating smaller portion sizes or by cutting out high-calorie foods  Read labels to find out how many calories are in the foods you eat  · You can also burn calories with exercise such as walking, swimming, or biking  You will be more likely to keep weight off if you make these changes part of your lifestyle  Exercise at least 30 minutes per day on most days of the week  You can also fit in more physical activity by taking the stairs instead of the elevator or parking farther away from stores  Ask your healthcare provider about the best exercise plan for you  Healthy meal plan for weight management:  A healthy meal plan includes a variety of foods, contains fewer calories, and helps you stay healthy  A healthy meal plan includes the following:     · Eat whole-grain foods more often  A healthy meal plan should contain fiber  Fiber is the part of grains, fruits, and vegetables that is not broken down by your body  Whole-grain foods are healthy and provide extra fiber in your diet  Some examples of whole-grain foods are whole-wheat breads and pastas, oatmeal, brown rice, and bulgur  · Eat a variety of vegetables every day  Include dark, leafy greens such as spinach, kale, ivory greens, and mustard greens   Eat yellow and orange vegetables such as carrots, sweet potatoes, and winter squash  · Eat a variety of fruits every day  Choose fresh or canned fruit (canned in its own juice or light syrup) instead of juice  Fruit juice has very little or no fiber  · Eat low-fat dairy foods  Drink fat-free (skim) milk or 1% milk  Eat fat-free yogurt and low-fat cottage cheese  Try low-fat cheeses such as mozzarella and other reduced-fat cheeses  · Choose meat and other protein foods that are low in fat  Choose beans or other legumes such as split peas or lentils  Choose fish, skinless poultry (chicken or turkey), or lean cuts of red meat (beef or pork)  Before you cook meat or poultry, cut off any visible fat  · Use less fat and oil  Try baking foods instead of frying them  Add less fat, such as margarine, sour cream, regular salad dressing, and mayonnaise to foods  Eat fewer high-fat foods  Some examples of high-fat foods include french fries, doughnuts, ice cream, and cakes  · Eat fewer sweets  Limit foods and drinks that are high in sugar  This includes candy, cookies, regular soda, and sweetened drinks  Ways to decrease calories:   · Eat smaller portions  ? Use a small plate with smaller servings  ? Do not eat second helpings  ? When you eat at a restaurant, ask for a box and place half of your meal in the box before you eat  ? Share an entrée with someone else  · Replace high-calorie snacks with healthy, low-calorie snacks  ? Choose fresh fruit, vegetables, fat-free rice cakes, or air-popped popcorn instead of potato chips, nuts, or chocolate  ? Choose water or calorie-free drinks instead of soda or sweetened drinks  · Do not shop for groceries when you are hungry  You may be more likely to make unhealthy food choices  Take a grocery list of healthy foods and shop after you have eaten  · Eat regular meals  Do not skip meals  Skipping meals can lead to overeating later in the day   This can make it harder for you to lose weight  Eat a healthy snack in place of a meal if you do not have time to eat a regular meal  Talk with a dietitian to help you create a meal plan and schedule that is right for you  Other things to consider as you try to lose weight:   · Be aware of situations that may give you the urge to overeat, such as eating while watching television  Find ways to avoid these situations  For example, read a book, go for a walk, or do crafts  · Meet with a weight loss support group or friends who are also trying to lose weight  This may help you stay motivated to continue working on your weight loss goals  © Copyright [address] Information is for End User's use only and may not be sold, redistributed or otherwise used for commercial purposes  All illustrations and images included in CareNotes® are the copyrighted property of A D A M , Inc  or [facility]epeda   The above information is an  only  It is not intended as medical advice for individual conditions or treatments  Talk to your doctor, nurse or pharmacist before following any medical regimen to see if it is safe and effective for you  Hypertension   WHAT YOU NEED TO KNOW:   What is hypertension? Hypertension is high blood pressure  Your blood pressure is the force of your blood moving against the walls of your arteries  Hypertension causes your blood pressure to get so high that your heart has to work much harder than normal  This can damage your heart  The cause of hypertension may not be known  This is called essential or primary hypertension  Hypertension caused by another medical condition, such as kidney disease, is called secondary hypertension  What do I need to know about the stages of hypertension? · Normal blood pressure is 119/79 or lower   Your healthcare provider may only check your blood pressure each year if it stays at a normal level  · Elevated blood pressure is 120/79 to 129/79   This is sometimes called prehypertension  Your healthcare provider may suggest lifestyle changes to help lower your blood pressure to a normal level  He or she may then check it again in 3 to 6 months  · Stage 1 hypertension is 130/80  to 139/89   Your provider may recommend lifestyle changes, medication, and checks every 3 to 6 months until your blood pressure is controlled  · Stage 2 hypertension is 140/90 or higher   Your provider will recommend lifestyle changes and have you take 2 kinds of hypertension medicines  You will also need to have your blood pressure checked monthly until it is controlled  What increases my risk for hypertension? · Age older than 54 years (men) or 72 (women)    · Stress, or a family history of hypertension or heart disease    · Obesity, lack of exercise, or too many high-sodium foods    · Use of tobacco, alcohol, or illegal drugs    · A medical condition, such as diabetes, kidney disease, thyroid disease, or adrenal gland disorder    · Certain medicines, such as steroids or birth control pills    What are the signs and symptoms of hypertension? You may have no signs or symptoms, or you may have any of the following:  · Headache    · Blurred vision    · Chest pain    · Dizziness or weakness    · Trouble breathing    · Nosebleeds    How is hypertension diagnosed? Your healthcare provider will take your blood pressure at several visits  You may also need to check your blood pressure at home  The provider will examine you and ask what medicines you take  He or she will also ask if you have a family history of high blood pressure and about any health conditions you have  He or she will also check your blood pressure and weight and examine your heart, lungs, and eyes  You may need any of the following tests:  · An ambulatory blood pressure monitor (ABPM)  is a device that you wear  ABPM measures your blood pressure while you do your regular daily activities   It records your blood pressure every 15 to 30 minutes during the day  It also records your blood pressure every 15 minutes to 1 hour at night  The recorded blood pressures help your healthcare provider know if you have hypertension not seen at your appointment  · Blood tests  may help healthcare providers find the cause of your hypertension  Blood tests can also help find other health problems caused by hypertension  · Urine tests  will be done to check your kidney function  Kidney problems can increase your risk for hypertension  What medicines are used to treat hypertension? · Antihypertensives  may be used to help lower your blood pressure  Several kinds of medicines are available  Your healthcare provider will choose medicines based on the kind of hypertension you have  You may need more than one type of medicine  Take the medicine exactly as directed  · Diuretics  help decrease extra fluid that collects in your body  This will help lower your blood pressure  You may urinate more often while you take this medicine  · Cholesterol medicine  helps lower your cholesterol level  A low cholesterol level helps prevent heart disease and makes it easier to control your blood pressure  What can I do to manage hypertension? · Check your blood pressure at home  Avoid smoking, caffeine, and exercise at least 30 minutes before checking your blood pressure  Sit and rest for 5 minutes before you take your blood pressure  Extend your arm and support it on a flat surface  Your arm should be at the same level as your heart  Follow the directions that came with your blood pressure monitor  Check your blood pressure 2 times, 1 minute apart, before you take your medicine in the morning  Also check your blood pressure before your evening meal  Keep a record of your readings and bring it to your follow-up visits  Ask your healthcare provider what your blood pressure should be  · Manage any other health conditions you have  Health conditions such as diabetes can increase your risk for hypertension  Follow your healthcare provider's instructions and take all your medicines as directed  · Ask about all medicines  Certain medicines can increase your blood pressure  Examples include oral birth control pills, decongestants, herbal supplements, and NSAIDs, such as ibuprofen  Your healthcare provider can tell you which medicines are safe for you to take  This includes prescription and over-the-counter medicines  What lifestyle changes can I make to manage hypertension? · Limit sodium (salt) as directed  Too much sodium can affect your fluid balance  Check labels to find low-sodium or no-salt-added foods  Some low-sodium foods use potassium salts for flavor  Too much potassium can also cause health problems  Your healthcare provider will tell you how much sodium and potassium are safe for you to have in a day  He or she may recommend that you limit sodium to 2,300 mg a day  · Follow the meal plan recommended by your healthcare provider  A dietitian or your provider can give you more information on low-sodium plans or the DASH (Dietary Approaches to Stop Hypertension) eating plan  The DASH plan is low in sodium, unhealthy fats, and total fat  It is high in potassium, calcium, and fiber  · Exercise to maintain a healthy weight  Exercise at least 30 minutes per day, on most days of the week  This will help decrease your blood pressure  Ask your healthcare provider about the best exercise plan for you  · Decrease stress  This may help lower your blood pressure  Learn ways to relax, such as deep breathing or listening to music  · Limit alcohol as directed  Alcohol can increase your blood pressure  A drink of alcohol is 12 ounces of beer, 5 ounces of wine, or 1½ ounces of liquor  · Do not smoke    Nicotine and other chemicals in cigarettes and cigars can increase your blood pressure and also cause lung damage  Ask your healthcare provider for information if you currently smoke and need help to quit  E-cigarettes or smokeless tobacco still contain nicotine  Talk to your healthcare provider before you use these products  Call 911 for any of the following:   · You have chest pain  · You have any of the following signs of a heart attack:      ? Squeezing, pressure, or pain in your chest    ? You may  also have any of the following:     § Discomfort or pain in your back, neck, jaw, stomach, or arm    § Shortness of breath    § Nausea or vomiting    § Lightheadedness or a sudden cold sweat    · You become confused or have difficulty speaking  · You suddenly feel lightheaded or have trouble breathing  When should I seek immediate care? · You have a severe headache or vision loss  · You have weakness in an arm or leg  When should I contact my healthcare provider? · You feel faint, dizzy, confused, or drowsy  · You have been taking your blood pressure medicine but your pressure is higher than your provider says it should be  · You have questions or concerns about your condition or care  CARE AGREEMENT:   You have the right to help plan your care  Learn about your health condition and how it may be treated  Discuss treatment options with your healthcare providers to decide what care you want to receive  You always have the right to refuse treatment  The above information is an  only  It is not intended as medical advice for individual conditions or treatments  Talk to your doctor, nurse or pharmacist before following any medical regimen to see if it is safe and effective for you  © Copyright [address] Information is for End User's use only and may not be sold, redistributed or otherwise used for commercial purposes   All illustrations and images included in CareNotes® are the copyrighted property of [employer]  or [address] [place], [place] [address]

## 2021-06-16 NOTE — PROGRESS NOTES
Assessment/Plan:    1  Essential hypertension  Comments:  will try with diet/excercise/weight loss and will follow back in office as needed          BMI Counseling: Body mass index is 31 37 kg/m²  Discussed the patient's BMI with her  The BMI is above normal  Nutrition recommendations include reducing portion sizes, decreasing overall calorie intake, 3-5 servings of fruits/vegetables daily, reducing fast food intake, consuming healthier snacks, decreasing soda and/or juice intake, moderation in carbohydrate intake, increasing intake of lean protein, reducing intake of saturated fat and trans fat and reducing intake of cholesterol  Exercise recommendations include exercising 3-5 times per week, joining a gym and strength training exercises  Patient Instructions:  Supportive care discussed and advised  Advised to RTO for any worsening and no improvement  Follow up for no improvement and worsening of conditions  Patient advised and educated when to see immediate medical care  Return if symptoms worsen or fail to improve  No future appointments  Subjective:      Patient ID: Gordy Hanley is a 41 y o  female  Chief Complaint   Patient presents with    Follow-up     BP         Vitals:  /100   Pulse (!) 111   Temp 98 6 °F (37 °C)   Resp 14   Ht 5' 1" (1 549 m)   Wt 75 3 kg (166 lb)   SpO2 96%   BMI 31 37 kg/m²     HPI  Patient is here to follow up on her BP  Brought home readings and SBP is between 130 to 140's and DBP between 90 to 100  Denies any chest pain, sob, headache and dizziness  Patient has strong family h/o HTN  Both of her sisters are being treated for HTN  Based on patient's readings and family history, strongly recommended to start antihypertensives but patient refusing at this time  Explained and discussed in detail about possible complications of uncontrolled BP like neurological conditions like stroke, heart disease, kidney failure and vascular issues   Patient verbalizes understanding of possible complications but still refusing medication and wants to try with weight loss, diet and exercise for next 3 to 6 months and stated that if still did not get better then will follow back in office and will consider antihypertensives  Will continue to check BP at home      The following portions of the patient's history were reviewed and updated as appropriate: allergies, current medications, past family history, past medical history, past social history, past surgical history and problem list       Review of Systems   Constitutional: Negative  HENT: Negative  Respiratory: Negative  Cardiovascular: Negative  Gastrointestinal: Negative  Genitourinary: Negative for difficulty urinating  Musculoskeletal: Negative  Skin: Negative for rash  Neurological: Negative  Psychiatric/Behavioral: Negative  Objective:    Social History     Tobacco Use   Smoking Status Never Smoker   Smokeless Tobacco Never Used       Allergies: No Known Allergies      Current Outpatient Medications   Medication Sig Dispense Refill    etonogestrel (NEXPLANON) subdermal implant 68 mg by Subdermal route once       No current facility-administered medications for this visit  Physical Exam  Constitutional:       Appearance: Normal appearance  HENT:      Head: Normocephalic and atraumatic  Nose: Nose normal    Eyes:      Conjunctiva/sclera: Conjunctivae normal    Cardiovascular:      Rate and Rhythm: Normal rate and regular rhythm  Pulses: Normal pulses  Heart sounds: Normal heart sounds  Pulmonary:      Effort: Pulmonary effort is normal       Breath sounds: Normal breath sounds  Skin:     General: Skin is warm and dry  Findings: No rash  Neurological:      Mental Status: She is alert and oriented to person, place, and time  Psychiatric:         Mood and Affect: Mood normal          Behavior: Behavior normal          Thought Content:  Thought content normal          Judgment: Judgment normal                      MAGGIE Tierney

## 2021-06-30 ENCOUNTER — IMMUNIZATIONS (OUTPATIENT)
Dept: FAMILY MEDICINE CLINIC | Facility: HOSPITAL | Age: 41
End: 2021-06-30

## 2021-06-30 DIAGNOSIS — Z23 ENCOUNTER FOR IMMUNIZATION: Primary | ICD-10-CM

## 2021-06-30 PROCEDURE — 91300 SARS-COV-2 / COVID-19 MRNA VACCINE (PFIZER-BIONTECH) 30 MCG: CPT

## 2021-06-30 PROCEDURE — 0001A SARS-COV-2 / COVID-19 MRNA VACCINE (PFIZER-BIONTECH) 30 MCG: CPT

## 2021-07-21 ENCOUNTER — IMMUNIZATIONS (OUTPATIENT)
Dept: FAMILY MEDICINE CLINIC | Facility: HOSPITAL | Age: 41
End: 2021-07-21

## 2021-07-21 DIAGNOSIS — Z23 ENCOUNTER FOR IMMUNIZATION: Primary | ICD-10-CM

## 2021-07-21 PROCEDURE — 0002A SARS-COV-2 / COVID-19 MRNA VACCINE (PFIZER-BIONTECH) 30 MCG: CPT

## 2021-07-21 PROCEDURE — 91300 SARS-COV-2 / COVID-19 MRNA VACCINE (PFIZER-BIONTECH) 30 MCG: CPT

## 2021-10-20 ENCOUNTER — TELEPHONE (OUTPATIENT)
Dept: OBGYN CLINIC | Facility: CLINIC | Age: 41
End: 2021-10-20

## 2021-11-16 ENCOUNTER — PROCEDURE VISIT (OUTPATIENT)
Dept: OBGYN CLINIC | Facility: CLINIC | Age: 41
End: 2021-11-16
Payer: COMMERCIAL

## 2021-11-16 VITALS — WEIGHT: 165 LBS | BODY MASS INDEX: 31.18 KG/M2 | SYSTOLIC BLOOD PRESSURE: 152 MMHG | DIASTOLIC BLOOD PRESSURE: 100 MMHG

## 2021-11-16 DIAGNOSIS — Z30.46 ENCOUNTER FOR REMOVAL AND REINSERTION OF NEXPLANON: Primary | ICD-10-CM

## 2021-11-16 PROCEDURE — 81025 URINE PREGNANCY TEST: CPT | Performed by: NURSE PRACTITIONER

## 2021-11-16 PROCEDURE — 11983 REMOVE/INSERT DRUG IMPLANT: CPT | Performed by: NURSE PRACTITIONER

## 2021-11-29 ENCOUNTER — ANNUAL EXAM (OUTPATIENT)
Dept: OBGYN CLINIC | Facility: CLINIC | Age: 41
End: 2021-11-29
Payer: COMMERCIAL

## 2021-11-29 VITALS — BODY MASS INDEX: 31.93 KG/M2 | WEIGHT: 169 LBS

## 2021-11-29 DIAGNOSIS — Z12.31 BREAST CANCER SCREENING BY MAMMOGRAM: ICD-10-CM

## 2021-11-29 DIAGNOSIS — Z01.419 ENCNTR FOR GYN EXAM (GENERAL) (ROUTINE) W/O ABN FINDINGS: Primary | ICD-10-CM

## 2021-11-29 PROCEDURE — G0145 SCR C/V CYTO,THINLAYER,RESCR: HCPCS | Performed by: NURSE PRACTITIONER

## 2021-11-29 PROCEDURE — S0612 ANNUAL GYNECOLOGICAL EXAMINA: HCPCS | Performed by: NURSE PRACTITIONER

## 2021-12-07 ENCOUNTER — OFFICE VISIT (OUTPATIENT)
Dept: FAMILY MEDICINE CLINIC | Facility: CLINIC | Age: 41
End: 2021-12-07
Payer: COMMERCIAL

## 2021-12-07 VITALS
HEART RATE: 100 BPM | WEIGHT: 168 LBS | RESPIRATION RATE: 18 BRPM | TEMPERATURE: 99.3 F | SYSTOLIC BLOOD PRESSURE: 120 MMHG | HEIGHT: 61 IN | BODY MASS INDEX: 31.72 KG/M2 | DIASTOLIC BLOOD PRESSURE: 98 MMHG | OXYGEN SATURATION: 92 %

## 2021-12-07 DIAGNOSIS — B34.9 VIRAL INFECTION, UNSPECIFIED: Primary | ICD-10-CM

## 2021-12-07 LAB
LAB AP GYN PRIMARY INTERPRETATION: NORMAL
LAB AP LMP: NORMAL
Lab: NORMAL

## 2021-12-07 PROCEDURE — 3008F BODY MASS INDEX DOCD: CPT | Performed by: FAMILY MEDICINE

## 2021-12-07 PROCEDURE — 99213 OFFICE O/P EST LOW 20 MIN: CPT | Performed by: FAMILY MEDICINE

## 2021-12-07 PROCEDURE — 0241U HB NFCT DS VIR RESP RNA 4 TRGT: CPT | Performed by: FAMILY MEDICINE

## 2021-12-07 PROCEDURE — 1036F TOBACCO NON-USER: CPT | Performed by: FAMILY MEDICINE

## 2021-12-08 LAB
FLUAV RNA RESP QL NAA+PROBE: NEGATIVE
FLUBV RNA RESP QL NAA+PROBE: NEGATIVE
RSV RNA RESP QL NAA+PROBE: NEGATIVE
SARS-COV-2 RNA RESP QL NAA+PROBE: NEGATIVE

## 2022-01-31 ENCOUNTER — HOSPITAL ENCOUNTER (OUTPATIENT)
Dept: RADIOLOGY | Facility: HOSPITAL | Age: 42
Discharge: HOME | End: 2022-01-31
Payer: COMMERCIAL

## 2022-01-31 VITALS — WEIGHT: 168 LBS | HEIGHT: 61 IN | BODY MASS INDEX: 31.72 KG/M2

## 2022-01-31 DIAGNOSIS — Z12.31 BREAST CANCER SCREENING BY MAMMOGRAM: ICD-10-CM

## 2022-01-31 PROCEDURE — 77063 BREAST TOMOSYNTHESIS BI: CPT

## 2022-01-31 PROCEDURE — 77067 SCR MAMMO BI INCL CAD: CPT

## 2022-02-05 ENCOUNTER — IMMUNIZATIONS (OUTPATIENT)
Dept: FAMILY MEDICINE CLINIC | Facility: HOSPITAL | Age: 42
End: 2022-02-05

## 2022-02-05 PROCEDURE — 0054A COVID-19 PFIZER VACC TRIS-SUCROSE GRAY CAP 0.3 ML: CPT

## 2022-02-05 PROCEDURE — 91305 COVID-19 PFIZER VACC TRIS-SUCROSE GRAY CAP 0.3 ML: CPT

## 2022-12-05 DIAGNOSIS — Z12.31 ENCOUNTER FOR SCREENING MAMMOGRAM FOR MALIGNANT NEOPLASM OF BREAST: Primary | ICD-10-CM

## 2022-12-05 NOTE — PROGRESS NOTES
Patient called to schedule annual , also needs script for mammo last completed in Jan , yearly not scheduled till April for annual mammo ordered    MM RODNEY

## 2023-06-06 ENCOUNTER — HOSPITAL ENCOUNTER (OUTPATIENT)
Dept: RADIOLOGY | Age: 43
Discharge: HOME | End: 2023-06-06
Attending: STUDENT IN AN ORGANIZED HEALTH CARE EDUCATION/TRAINING PROGRAM
Payer: COMMERCIAL

## 2023-06-06 VITALS — WEIGHT: 169 LBS | BODY MASS INDEX: 31.91 KG/M2 | HEIGHT: 61 IN

## 2023-06-06 DIAGNOSIS — Z12.31 ENCOUNTER FOR SCREENING MAMMOGRAM FOR MALIGNANT NEOPLASM OF BREAST: ICD-10-CM

## 2023-06-06 PROCEDURE — 77067 SCR MAMMO BI INCL CAD: CPT

## 2023-06-06 PROCEDURE — 77063 BREAST TOMOSYNTHESIS BI: CPT

## 2024-03-18 ENCOUNTER — TELEPHONE (OUTPATIENT)
Age: 44
End: 2024-03-18

## 2024-03-18 DIAGNOSIS — Z12.31 ENCOUNTER FOR SCREENING MAMMOGRAM FOR BREAST CANCER: Primary | ICD-10-CM

## 2024-03-18 NOTE — TELEPHONE ENCOUNTER
Patient called and asked for a mammogram script and she goes to [facility] and she does not need a call back.  I gave her central scheduling phone number.   Thank you

## 2024-07-16 ENCOUNTER — HOSPITAL ENCOUNTER (OUTPATIENT)
Dept: RADIOLOGY | Facility: HOSPITAL | Age: 44
Discharge: HOME | End: 2024-07-16
Payer: COMMERCIAL

## 2024-07-16 VITALS — BODY MASS INDEX: 31.15 KG/M2 | HEIGHT: 61 IN | WEIGHT: 165 LBS

## 2024-07-16 DIAGNOSIS — Z12.31 ENCOUNTER FOR SCREENING MAMMOGRAM FOR BREAST CANCER: ICD-10-CM

## 2024-07-16 PROCEDURE — 77067 SCR MAMMO BI INCL CAD: CPT

## 2024-07-16 PROCEDURE — 77063 BREAST TOMOSYNTHESIS BI: CPT

## 2024-10-03 ENCOUNTER — ANNUAL EXAM (OUTPATIENT)
Dept: OBGYN CLINIC | Facility: CLINIC | Age: 44
End: 2024-10-03
Payer: COMMERCIAL

## 2024-10-03 VITALS
HEIGHT: 61 IN | WEIGHT: 169 LBS | BODY MASS INDEX: 31.91 KG/M2 | DIASTOLIC BLOOD PRESSURE: 116 MMHG | SYSTOLIC BLOOD PRESSURE: 158 MMHG

## 2024-10-03 DIAGNOSIS — Z12.4 SCREENING FOR CERVICAL CANCER: ICD-10-CM

## 2024-10-03 DIAGNOSIS — Z12.11 SCREENING FOR COLON CANCER: ICD-10-CM

## 2024-10-03 DIAGNOSIS — Z01.419 WOMEN'S ANNUAL ROUTINE GYNECOLOGICAL EXAMINATION: Primary | ICD-10-CM

## 2024-10-03 DIAGNOSIS — Z12.31 ENCOUNTER FOR SCREENING MAMMOGRAM FOR BREAST CANCER: ICD-10-CM

## 2024-10-03 PROCEDURE — S0612 ANNUAL GYNECOLOGICAL EXAMINA: HCPCS | Performed by: STUDENT IN AN ORGANIZED HEALTH CARE EDUCATION/TRAINING PROGRAM

## 2024-10-03 PROCEDURE — G0145 SCR C/V CYTO,THINLAYER,RESCR: HCPCS | Performed by: STUDENT IN AN ORGANIZED HEALTH CARE EDUCATION/TRAINING PROGRAM

## 2024-10-03 PROCEDURE — G0476 HPV COMBO ASSAY CA SCREEN: HCPCS | Performed by: STUDENT IN AN ORGANIZED HEALTH CARE EDUCATION/TRAINING PROGRAM

## 2024-10-03 NOTE — PROGRESS NOTES
[facility]   Annual Well Woman Exam  [place]    ASSESSMENT & PLAN: Nabila Hankins is a 44 y.o.  with normal gynecologic exam.    1.  Routine well woman exam done today.  2.  Pap and HPV: Pap with HPV was done today.  Current ASCCP Guidelines reviewed. Prefers to continue annual.  3.  Mammogram ordered. Recommend yearly mammography per ACOG guidelines.   4.  Nabila is low risk and does not need or desire STI testing today.   5.  Nabila is using Nexplanon for contraception and scheduled for replacement.  6. The following were reviewed in today's visit: mammography screening ordered, exercise, vitamin D, healthy diet, and colonoscopy discussed and ordered. Discuss BP with PCP.   7. Return to office for Nexplanon replacement    All questions answered to the best of my ability.      Subjective:    CC:  Annual Gynecologic Examination    HPI: Nabila Hankins is a 44 y.o.  who presents for annual gynecologic examination.      Pap: 2023: NILM, HPV neg  --NILM  2020--NILM  2019--NILM  2018--NILM  [facility] biopsy in 20s     Mammo:    2024: BIRADS1  2023: BIRADS1  2022: BIRADS2  Colonoscopy: NA     GYN  Nexplanon on third device, placed 2021  Doesn't get a period with nexplanon     OB  1xSAB     G/U  Denies hematuria and dysuria     Breast  Complaints: denies  Denies: breast lump, nipple discharge and skin color change  Family hx: maternal aunt--breast cancer and colon cancer  Maternal cousin--breast cancer  Denies fhx of uterine, ovarian cancers  Patient does do regular self-exams     Health Maintenance:    She does follow with a PCP.  She doesn't exercise on a regular basis, new job more sedentary, having trouble doing Deepa because of crazy hours.  She does follow a well balanced diet.   She does not use tobacco, no other drugs  Minimal alcohol (<1x/week)  She feels safe at home; living alone    Past Medical History:   Diagnosis Date    Abnormal Pap smear of cervix 2017    Miscarriage  10/2013    Urogenital trichomoniasis 4/2014       History reviewed. No pertinent surgical history.    Past OB/Gyn History:     No LMP recorded (lmp unknown). Patient has had an implant.    Family History:  Family History   Problem Relation Age of Onset    Hypertension Mother     Osteopenia Mother     Hypertension Father     Hyperlipidemia Father     Lung cancer Father     Hypertension Sister     Hypertension Sister     Hypertension Maternal Grandmother     Hyperlipidemia Maternal Grandmother     Diabetes Maternal Grandmother     Diabetes Maternal Grandfather     No Known Problems Paternal Grandmother     Diabetes Paternal Grandfather     Hyperlipidemia Paternal Grandfather     Breast cancer Maternal Aunt 38    Colon cancer Maternal Aunt 60    No Known Problems Maternal Aunt     No Known Problems Maternal Aunt     No Known Problems Paternal Aunt     Breast cancer Cousin         doesn't know the age       Social History:  Social History     Socioeconomic History    Marital status: Single     Spouse name: Not on file    Number of children: Not on file    Years of education: Not on file    Highest education level: Not on file   Occupational History    Not on file   Tobacco Use    Smoking status: Never    Smokeless tobacco: Never   Vaping Use    Vaping status: Never Used   Substance and Sexual Activity    Alcohol use: Yes     Alcohol/week: 1.0 standard drink of alcohol     Types: 1 Glasses of wine per week     Comment: socially    Drug use: No    Sexual activity: Yes     Partners: Male     Birth control/protection: Condom Male, Implant   Other Topics Concern    Not on file   Social History Narrative    Not on file     Social Determinants of Health     Financial Resource Strain: Not on file   Food Insecurity: Not on file   Transportation Needs: Not on file   Physical Activity: Not on file   Stress: Not on file   Social Connections: Not on file   Intimate Partner Violence: Not on file   Housing Stability: Not on file  "      No Known Allergies    Current Outpatient Medications:     etonogestrel (NEXPLANON) subdermal implant, 68 mg by Subdermal route Once every 3 years, Disp: , Rfl:     Review of Systems:  Denies chest pain, shortness of breath, abdominal pain, vaginal bleeding, vaginal discharge. All other systems negative unless otherwise stated.     Physical Exam:  BP (!) 158/116 (BP Location: Right arm, Patient Position: Sitting)   Ht 5' 1\" (1.549 m)   Wt 76.7 kg (169 lb)   LMP  (LMP Unknown) Comment: nexplanon  BMI 31.93 kg/m²  Body mass index is 31.93 kg/m².   GEN: The patient was alert and oriented x3, pleasant well-appearing female in no acute distress.   HEENT:  Unremarkable, no anterior or posterior lymphadenopathy, no thyromegaly  CV:  Regular rate and rhythm, normal S1 and S2, no murmurs  RESP:  Clear to auscultation bilaterally, no wheezes, rales or rhonchi  BREAST:  Symmetric breasts with no palpable breast masses or obvious breast lesions. She has no retractions or nipple discharge. She has no axillary abnormalities or palpable masses.   GI:  Soft, nontender, non-distended  MSK: bilateral lower extremities are nontender, no edema, nexplanon palpated in right arm  : Normal appearing external female genitalia, normal appearing urethral meatus. On sterile speculum exam, normal appearing vaginal epithelium, no vaginal discharge, no bleeding, grossly normal appearing cervix. On bimanual exam, no cervical motion tenderness; uterus is smooth, mobile, non-tender, normal size. No tenderness or fullness in the bilateral adnexa.   CARRILLO: no masses, normal tone, neg FOBT      "

## 2024-10-04 LAB
HPV HR 12 DNA CVX QL NAA+PROBE: NEGATIVE
HPV16 DNA CVX QL NAA+PROBE: NEGATIVE
HPV18 DNA CVX QL NAA+PROBE: NEGATIVE

## 2024-10-08 LAB
LAB AP GYN PRIMARY INTERPRETATION: NORMAL
Lab: NORMAL

## 2024-11-26 ENCOUNTER — PROCEDURE VISIT (OUTPATIENT)
Dept: OBGYN CLINIC | Facility: CLINIC | Age: 44
End: 2024-11-26
Payer: COMMERCIAL

## 2024-11-26 VITALS
HEIGHT: 61 IN | SYSTOLIC BLOOD PRESSURE: 140 MMHG | DIASTOLIC BLOOD PRESSURE: 96 MMHG | BODY MASS INDEX: 32.1 KG/M2 | WEIGHT: 170 LBS

## 2024-11-26 DIAGNOSIS — Z32.02 URINE PREGNANCY TEST NEGATIVE: ICD-10-CM

## 2024-11-26 DIAGNOSIS — Z30.46 ENCOUNTER FOR REMOVAL AND REINSERTION OF NEXPLANON: Primary | ICD-10-CM

## 2024-11-26 LAB — SL AMB POCT URINE HCG: NORMAL

## 2024-11-26 PROCEDURE — 11983 REMOVE/INSERT DRUG IMPLANT: CPT | Performed by: STUDENT IN AN ORGANIZED HEALTH CARE EDUCATION/TRAINING PROGRAM

## 2024-11-26 PROCEDURE — 81025 URINE PREGNANCY TEST: CPT | Performed by: STUDENT IN AN ORGANIZED HEALTH CARE EDUCATION/TRAINING PROGRAM

## 2024-11-26 NOTE — PROGRESS NOTES
"Nexplanon placed 11/16/2021, plan for removal and replacement today  Upt negative today    Universal Protocol:  Procedure performed by:  Consent: Verbal consent obtained. Written consent obtained.  Risks and benefits: risks, benefits and alternatives were discussed  Consent given by: patient  Time out: Immediately prior to procedure a \"time out\" was called to verify the correct patient, procedure, equipment, support staff and site/side marked as required.  Required items: required blood products, implants, devices, and special equipment available  Patient identity confirmed: verbally with patient and provided demographic data  Remove and insert drug implant    Date/Time: 11/26/2024 11:30 AM    Performed by: Hemalatha Helm MD  Authorized by: Hemalatha Helm MD    Indication:     Indication: Presence of non-biodegradable drug delivery implant    Pre-procedure:     Pre-procedure timeout performed: yes      Prepped with: povidone-iodine      Local anesthetic:  Lidocaine 1%    The site was cleaned and prepped in a sterile fashion: yes    Procedure:     Procedure:  Removal with reinsertion    Small stab incision was made in arm: yes      Left/right:  Right    Preloaded contraceptive capsule trocar was placed subdermally: yes      Visualization of implant was obtained: yes      Contraceptive capsule was inserted and trocar removed: yes      Visualization of notch in stylet and palpation of device: yes      Palpation confirms placement by provider and patient: yes      Site was closed with steri-strips and pressure bandage applied: yes          "

## 2024-12-20 ENCOUNTER — TELEPHONE (OUTPATIENT)
Dept: GASTROENTEROLOGY | Facility: AMBULARY SURGERY CENTER | Age: 44
End: 2024-12-20

## 2024-12-20 NOTE — TELEPHONE ENCOUNTER
Scheduled date of colonoscopy (as of today): 2/21/25  Physician performing colonoscopy: KELLEN  Location of colonoscopy: [facility]  Bowel prep reviewed with patient: ELSIE / BENJAMIN  Instructions reviewed with patient by: ALFREDO  Clearances: N/A

## 2024-12-20 NOTE — LETTER
December 20, 2024    [address]  [address] [facility] [id number]      Dear MsYvette Hankins:        If you have any questions or concerns, please don't hesitate to call.    Sincerely,             Shahida Hartley        CC: No Recipients    Medicine Instructions for Adults with Diabetes who Need a Bowel Prep       Follow these instructions when a BOWEL PREP is required for your procedure or surgery!    NOTE:   GLP-1 Agonists taken weekly: do not take in the 7 days before your procedure   SGLT-2 Inhibitors: do not take in the 4 days before your procedure     On the Day Before Surgery/Procedure  If you are having a procedure (e.g. Colonoscopy) or surgery that requires a bowel prep and you may have at least a clear liquid diet, follow the directions below based on the type of medicine you take for your diabetes.     Type of Medicine You Take Examples What to do   Pre-Mixed Insulin - Intermediate Acting Humalog® 75/25, Humulin® 70/30, Novolog® 70/30, Regular Insulin Take ½ your regular dose the evening before your procedure   Rapid/Fast Acting Insulin Humalog® U200, NovoLog®, Apidra®, Fiasp® Take ½ your regular dose the evening before your procedure.   Long-Acting Insulin Lantus®, Levemir®, Tresiba®, Toujeo®, Basaglar® Take your FULL regular dose the day before procedure   Oral Sulfonylurea Glipizide/Glimepiride/Glucotrol® Take ½ your regular dose the evening before your procedure   Other Oral Diabetes Medicines Metformin®, Glucophage®, Glucophage XR®, Riomet®, Glumetza®), Actose®, Avandia®, Glyset®, Prandin® Take your regular dose with dinner in the evening before your procedure   GLP-1 Agonists AdlyxinÒ, ByettaÒ, BydureonÒ, OzempicÒ, SoliquaÒ, TanzeumÒ, TrulicityÒ, VictozaÒ, Saxenda®, Rybelsus® If taken daily, take as normal    If taken weekly, do not take this medicine for 7 days before your procedure including the day of the procedure (resume taking after the procedure)   SGLT-2 Inhibitors Jardiance®,  Invokana®, Farxiga®,   Steglatro®, Brenzavvy®, Qtern®, Segluromet®, Glyxambi®, Synjardy®, Synjardy XR®, Invokamet®, Invokamet XR®, Trijary XR®, Xigduo XR®, Steglujan® Do not take for 4 days before your procedure including the day of the procedure (resume taking after the procedure)                More information continued on back                    Medicine Instructions for Adults with Diabetes who Need a Bowel Prep  Page 2      On the Day of Surgery/Procedure  Follow the directions below based on the type of medicine you take for your diabetes.     Type of Medicine You Take Examples What to do   Long-Acting Insulin Lantus®, Levemir®, Tresiba®, Toujeo®, Basaglar®, Semglee®   If you usually take your Long-Acting Insulin in the morning, take the full dose as scheduled.   GLP-1 Agonists AdlyxinÒ, ByettaÒ, BydureonÒ, OzempicÒ, SoliquaÒ, TanzeumÒ, TrulicityÒ, VictozaÒ, Saxenda®, Rybelsus® Do NOT take this medicine on the day of your procedure (resume taking after the procedure)       On the Day of Surgery/Procedure (continued)  Except for the morning Long-Acting Insulin, DO NOT take ANY diabetic medicine on the day of your procedure unless you were instructed by the doctor who manages your diabetes medicines.    Continue to check your blood sugars.  If you have an insulin pump, ask your endocrinologist for instructions at least 3 days before your procedure. NOTE: If you are not able to ask your endocrinologist in advance, on the day of the procedure set your insulin pump to your basal rate only. Bring your insulin pump supplies to the hospital.     If you have any questions about taking your diabetes medicines prior to your procedure, please contact the doctor who manages your diabetes medicines.         COLONOSCOPY  MIRALAX/Dulcolax Bowel Preparation Instructions    The OR/GI Lab will contact you the evening prior to your procedure with your exact arrival time.    Our practice requires a 1 week notice for any  cancellations or rescheduling. We kindly ask that you immediately notify us of any changes including any new medications that are prescribed. Thank you for your cooperation.     WEEK BEFORE YOUR PROCEDURE:  Stop taking Iron tablets.  5 days prior, AVOID vegetables and fruits with skins or seeds, nuts, corn, popcorn and whole grain breads.   Purchase: One (1) 238-gram container of Miralax (polyethylene glycol 3350), four (4) 5 mg Dulcolax (bisacodyl) tablets, and one (1) 64-ounce bottle of Gatorade (sports drink) - no red, orange, or purple. These may be purchased at any pharmacy without a prescription. Generic products are permissible.   Arrange responsible transportation for day of the procedure.     DAY BEFORE THE PROCEDURE:   CLEAR liquids only for entire day prior. Nothing red, orange or purple.    You MAY have:                                                               Soda  Water  Broth Gatorade  Jello  Popsicles Coffee/tea without milk/creamer     YOU MAY NOT HAVE:  Solid foods   Milk and milk products    Juice with pulp    BOWEL PREPARATION:  Includes: One (1) 238-gram container of Miralax (polyethylene glycol 3350), four (4) 5 mg Dulcolax (bisacodyl) tablets, and one (1) 64-ounce bottle of Gatorade (sports drink).  Preparation may be refrigerated.  Entire bowel prep should be completed.     Afternoon before the procedure (2:00 pm - 5:00 pm):    Take two (2) 5 mg Dulcolax laxative tablets.     Evening before the procedure (6:00 pm):  Mix entire container of Miralax with one (1) 64-ounce bottle of Gatorade and shake until all medication is dissolved.   Begin drinking solution. Drink an eight (8) ounce cup every 10-15 minutes until you have consumed half (32 ounces) of the solution.  Refrigerate remaining solution.    Night before the procedure (8:00 pm):  Take two (2) 5 mg Dulcolax laxative tablets.     Beginning 5 hours before your procedure:  Drink the remaining amount of prepared solution (32 ounces).   Drink an eight (8) ounce cup every 10-15 minutes until you have consumed the remaining solution.     Bowel prep should be completed 4 hours prior to procedure time.    NOTHING TO EAT OR DRINK AFTER MIDNIGHT- EXCEPT FOR YOUR PREP    DAY OF THE PROCEDURE:  You may brush your teeth.  Leave all jewelry at home.  Please arrive for your procedure as indicated by the OR / GI Lab / Endoscopy Unit. The hospital will contact you the day before with your exact arrival time.   Make sure you have arranged ahead of time for a responsible adult (18 or older) to accompany and drive you home after the procedure.  Please discuss any transportation concerns with our staff prior to your procedure.    The effects of the anesthesia can persist for 24 hours.  After receiving the sedation, you must exercise caution before engaging in any activity that could harm yourself and others (such as driving a car).  Do not make any important decisions or do not drink any alcoholic beverages during this time period.  After your procedure, you may have anything you'd like to eat or drink.  You will probably want to start with something light.  Please include plenty of fluids.  Avoid items that cause gas such as sodas and salads.    SPECIAL INSTRUCTIONS:    For patients currently taking blood thinners and/or antiplatelet therapy our office will contact the prescribing provider.  Our office will contact you with any required changes to your medication regimen.     Blood thinner (i.e. - Coumadin, Pradaxa, Lovenox, Xarelto, Eliquis)  ?  Continue (Do Not Stop)  ? Stop______________for_____________days prior to the procedure.    Antiplatelet (i.e. - Plavix, Aggrenox, Effient, Brilinta)  ?  Continue (Do Not Stop)  ? Stop______________for_____________days prior to the procedure.       Diabetes:   If you are Diabetic, please see separate Diabetic Instruction Sheet.          Prescribed medications:  Do not stop your aspirin, or any of your other medications  (unless instructed otherwise).    Take the rest of your prescribed medications with small sips of water at least 2 hours prior to your procedure.      For any questions or concerns related to your bowel preparation or pre-procedure instructions, please contact our office at [phone].  Thank you for choosing [facility]!

## 2024-12-20 NOTE — TELEPHONE ENCOUNTER
12/20/24  Screened by: Shahida Hartley    Referring Provider     Pre- Screening:     There is no height or weight on file to calculate BMI.  Has patient been referred for a routine screening Colonoscopy? yes  Is the patient between 45-75 years old? yes      Previous Colonoscopy no   If yes:    Date:     Facility:     Reason:       Does the patient want to see a Gastroenterologist prior to their procedure OR are they having any GI symptoms? no    Has the patient been hospitalized or had abdominal surgery in the past 6 months? no    Does the patient use supplemental oxygen? no    Does the patient take Coumadin, Lovenox, Plavix, Elliquis, Xarelto, or other blood thinning medication? no    Has the patient had a stroke, cardiac event, or stent placed in the past year? no    If patient is between 45yrs - 49yrs, please advise patient that we will have to confirm benefits & coverage with their insurance company for a routine screening colonoscopy.

## 2025-02-07 ENCOUNTER — ANESTHESIA EVENT (OUTPATIENT)
Dept: ANESTHESIOLOGY | Facility: HOSPITAL | Age: 45
End: 2025-02-07

## 2025-02-07 ENCOUNTER — ANESTHESIA (OUTPATIENT)
Dept: ANESTHESIOLOGY | Facility: HOSPITAL | Age: 45
End: 2025-02-07

## 2025-02-21 ENCOUNTER — HOSPITAL ENCOUNTER (OUTPATIENT)
Dept: GASTROENTEROLOGY | Facility: AMBULARY SURGERY CENTER | Age: 45
Setting detail: OUTPATIENT SURGERY
End: 2025-02-21
Attending: INTERNAL MEDICINE
Payer: COMMERCIAL

## 2025-02-21 ENCOUNTER — ANESTHESIA (OUTPATIENT)
Dept: GASTROENTEROLOGY | Facility: AMBULARY SURGERY CENTER | Age: 45
End: 2025-02-21
Payer: COMMERCIAL

## 2025-02-21 VITALS
HEART RATE: 89 BPM | TEMPERATURE: 97.4 F | RESPIRATION RATE: 18 BRPM | OXYGEN SATURATION: 99 % | DIASTOLIC BLOOD PRESSURE: 89 MMHG | SYSTOLIC BLOOD PRESSURE: 147 MMHG

## 2025-02-21 DIAGNOSIS — Z12.11 SCREENING FOR COLON CANCER: ICD-10-CM

## 2025-02-21 LAB
EXT PREGNANCY TEST URINE: NEGATIVE
EXT. CONTROL: NORMAL

## 2025-02-21 PROCEDURE — 45380 COLONOSCOPY AND BIOPSY: CPT | Performed by: INTERNAL MEDICINE

## 2025-02-21 PROCEDURE — 88305 TISSUE EXAM BY PATHOLOGIST: CPT | Performed by: STUDENT IN AN ORGANIZED HEALTH CARE EDUCATION/TRAINING PROGRAM

## 2025-02-21 PROCEDURE — 81025 URINE PREGNANCY TEST: CPT | Performed by: INTERNAL MEDICINE

## 2025-02-21 RX ORDER — LIDOCAINE HYDROCHLORIDE 10 MG/ML
INJECTION, SOLUTION EPIDURAL; INFILTRATION; INTRACAUDAL; PERINEURAL AS NEEDED
Status: DISCONTINUED | OUTPATIENT
Start: 2025-02-21 | End: 2025-02-21

## 2025-02-21 RX ORDER — PROPOFOL 10 MG/ML
INJECTION, EMULSION INTRAVENOUS AS NEEDED
Status: DISCONTINUED | OUTPATIENT
Start: 2025-02-21 | End: 2025-02-21

## 2025-02-21 RX ORDER — SODIUM CHLORIDE, SODIUM LACTATE, POTASSIUM CHLORIDE, CALCIUM CHLORIDE 600; 310; 30; 20 MG/100ML; MG/100ML; MG/100ML; MG/100ML
INJECTION, SOLUTION INTRAVENOUS CONTINUOUS PRN
Status: DISCONTINUED | OUTPATIENT
Start: 2025-02-21 | End: 2025-02-21

## 2025-02-21 RX ORDER — LABETALOL HYDROCHLORIDE 5 MG/ML
INJECTION, SOLUTION INTRAVENOUS AS NEEDED
Status: DISCONTINUED | OUTPATIENT
Start: 2025-02-21 | End: 2025-02-21

## 2025-02-21 RX ADMIN — LIDOCAINE HYDROCHLORIDE 50 MG: 10 INJECTION, SOLUTION EPIDURAL; INFILTRATION; INTRACAUDAL; PERINEURAL at 08:27

## 2025-02-21 RX ADMIN — PROPOFOL 50 MG: 10 INJECTION, EMULSION INTRAVENOUS at 08:33

## 2025-02-21 RX ADMIN — LABETALOL 20 MG/4 ML (5 MG/ML) INTRAVENOUS SYRINGE 10 MG: at 08:32

## 2025-02-21 RX ADMIN — SODIUM CHLORIDE, SODIUM LACTATE, POTASSIUM CHLORIDE, AND CALCIUM CHLORIDE: .6; .31; .03; .02 INJECTION, SOLUTION INTRAVENOUS at 08:17

## 2025-02-21 RX ADMIN — PROPOFOL 50 MG: 10 INJECTION, EMULSION INTRAVENOUS at 08:29

## 2025-02-21 RX ADMIN — PROPOFOL 150 MG: 10 INJECTION, EMULSION INTRAVENOUS at 08:27

## 2025-02-21 NOTE — ANESTHESIA PREPROCEDURE EVALUATION
Procedure:  COLONOSCOPY    Relevant Problems   No relevant active problems        Physical Exam    Airway    Mallampati score: I  TM Distance: >3 FB  Neck ROM: full     Dental   No notable dental hx     Cardiovascular      Pulmonary      Other Findings  post-pubertal.      Anesthesia Plan  ASA Score- 2     Anesthesia Type- IV sedation with anesthesia with ASA Monitors.         Additional Monitors:     Airway Plan:            Plan Factors-Exercise tolerance (METS): >4 METS.    Chart reviewed.   Existing labs reviewed. Patient summary reviewed.    Patient is not a current smoker.      There is medical exclusion for perioperative obstructive sleep apnea risk education.        Induction- intravenous.    Postoperative Plan-         Informed Consent- Anesthetic plan and risks discussed with patient.  I personally reviewed this patient with the CRNA. Discussed and agreed on the Anesthesia Plan with the CRNA..      NPO Status:  Vitals Value Taken Time   Date of last liquid 02/21/25 02/21/25 0744   Time of last liquid 0230 02/21/25 0744   Date of last solid 02/19/25 02/21/25 0744   Time of last solid 2200 02/21/25 0744

## 2025-02-21 NOTE — ANESTHESIA POSTPROCEDURE EVALUATION
Post-Op Assessment Note    CV Status:  Stable  Pain Score: 0    Pain management: adequate       Mental Status:  Sleepy   Hydration Status:  Stable   PONV Controlled:  None   Airway Patency:  Patent     Post Op Vitals Reviewed: Yes    No anethesia notable event occurred.    Staff: Anesthesiologist, CRNA           Last Filed PACU Vitals:  Vitals Value Taken Time   Temp     Pulse 89 02/21/25 0842   /83 02/21/25 0842   Resp 17 02/21/25 0842   SpO2 99 % 02/21/25 0842

## 2025-02-21 NOTE — H&P
History and Physical -  [facility]  Nabila Hankins 44 y.o. female MRN: [id number]    HPI: Nabila Hankins is a 44 y.o. year old female who presents with screening colonoscopy      Review of Systems    Historical Information   Past Medical History:   Diagnosis Date    Abnormal Pap smear of cervix 2017    Miscarriage 10/2013    Urogenital trichomoniasis 4/2014     History reviewed. No pertinent surgical history.  Social History   Social History     Substance and Sexual Activity   Alcohol Use Yes    Alcohol/week: 1.0 standard drink of alcohol    Types: 1 Glasses of wine per week    Comment: socially     Social History     Substance and Sexual Activity   Drug Use No     Social History     Tobacco Use   Smoking Status Never   Smokeless Tobacco Never     Family History   Problem Relation Age of Onset    Hypertension Mother     Osteopenia Mother     Hypertension Father     Hyperlipidemia Father     Lung cancer Father     Hypertension Sister     Hypertension Sister     Hypertension Maternal Grandmother     Hyperlipidemia Maternal Grandmother     Diabetes Maternal Grandmother     Diabetes Maternal Grandfather     No Known Problems Paternal Grandmother     Diabetes Paternal Grandfather     Hyperlipidemia Paternal Grandfather     Breast cancer Maternal Aunt 38    Colon cancer Maternal Aunt 60    No Known Problems Maternal Aunt     No Known Problems Maternal Aunt     No Known Problems Paternal Aunt     Breast cancer Cousin         doesn't know the age       Meds/Allergies     Not in a hospital admission.    No Known Allergies    Objective     BP (!) 179/103   Pulse 95   Temp (!) 97.4 °F (36.3 °C) (Temporal)   Resp 18   SpO2 99%       PHYSICAL EXAM    Gen: NAD  CV: RRR  CHEST: Clear  ABD: soft, NT/ND  EXT: no edema  Neuro: AAO      ASSESSMENT/PLAN:  This is a 44 y.o. year old female here for screening colonoscopy    PLAN:   Procedure: colonoscopy

## 2025-02-25 PROCEDURE — 88305 TISSUE EXAM BY PATHOLOGIST: CPT | Performed by: STUDENT IN AN ORGANIZED HEALTH CARE EDUCATION/TRAINING PROGRAM

## 2025-03-03 ENCOUNTER — RESULTS FOLLOW-UP (OUTPATIENT)
Dept: GASTROENTEROLOGY | Facility: AMBULARY SURGERY CENTER | Age: 45
End: 2025-03-03

## 2025-05-24 ENCOUNTER — OFFICE VISIT (OUTPATIENT)
Dept: URGENT CARE | Facility: CLINIC | Age: 45
End: 2025-05-24
Payer: COMMERCIAL

## 2025-05-24 VITALS
TEMPERATURE: 97.3 F | HEART RATE: 84 BPM | SYSTOLIC BLOOD PRESSURE: 190 MMHG | RESPIRATION RATE: 17 BRPM | OXYGEN SATURATION: 99 % | DIASTOLIC BLOOD PRESSURE: 100 MMHG

## 2025-05-24 DIAGNOSIS — H72.92 OTITIS MEDIA, SEROUS, TM RUPTURE, LEFT: Primary | ICD-10-CM

## 2025-05-24 DIAGNOSIS — I10 ESSENTIAL HYPERTENSION: ICD-10-CM

## 2025-05-24 DIAGNOSIS — H65.92 OTITIS MEDIA, SEROUS, TM RUPTURE, LEFT: Primary | ICD-10-CM

## 2025-05-24 PROCEDURE — 99213 OFFICE O/P EST LOW 20 MIN: CPT | Performed by: FAMILY MEDICINE

## 2025-05-24 RX ORDER — LISINOPRIL 20 MG/1
20 TABLET ORAL DAILY
Qty: 30 TABLET | Refills: 0 | Status: SHIPPED | OUTPATIENT
Start: 2025-05-24 | End: 2025-06-23

## 2025-05-24 RX ORDER — FLUTICASONE PROPIONATE 50 MCG
1 SPRAY, SUSPENSION (ML) NASAL 2 TIMES DAILY
Qty: 16 G | Refills: 0 | Status: SHIPPED | OUTPATIENT
Start: 2025-05-24

## 2025-05-24 NOTE — PROGRESS NOTES
"  [facility]        NAME: Nabila Hankins is a 44 y.o. female  : 1980    MRN: [id number]  DATE: May 24, 2025  TIME: 12:23 PM    Assessment and Plan   Otitis media, serous, tm rupture, left [H65.92, H72.92]  1. Otitis media, serous, tm rupture, left  fluticasone (FLONASE) 50 mcg/act nasal spray    amoxicillin-clavulanate (AUGMENTIN) 875-125 mg per tablet    Ambulatory Referral to Otolaryngology      2. Essential hypertension  lisinopril (ZESTRIL) 20 mg tablet        Appears to have a left TM rupture likely secondary to eustachian tube dysfunction.  Will cover for the possibility of infection with Augmentin for the next 5 days.  Flonase prescribed to decrease eustachian tube inflammation.  Referred to ENT for further evaluation and management.    Uncontrolled blood pressure which appears to have persisted for the past few years.  Will opt to initiate lisinopril 20 mg daily and patient strongly encouraged to follow-up with her PCP as soon as possible for continued management.    Patient Instructions     Follow up with PCP in 3-5 days.  Proceed to  ER if symptoms worsen.    If tests have been performed at [facility], our office will contact you with results if changes need to be made to the care plan discussed with you at the visit.  [facility]    Chief Complaint     Chief Complaint   Patient presents with    Facial Pain     Started with sore throat and sinus pressure on Wednesday. Gland swelling on Thursday night with left side ear pain with drainage. Voice horse and cough intermittent dry \" tickle\" . Patient used warm compress for ear pain and drainage. Denies fevers. Otc allergy medication for symptoms.          History of Present Illness       44-year-old female presents today with about 3 days of URI symptoms including sore throat, coughing with sinus pressure and left otalgia.  Reports that her left pain radiates down the angle of her jaw and is now " associated with muffled hearing.  Did notice some fluid out of the left ear yesterday.  Denies any obvious fevers, chills, chest pain or dyspnea.      Review of Systems   Review of Systems   Constitutional:  Negative for chills and fever.   HENT:  Positive for ear pain (left), hearing loss, sinus pressure and sore throat.    Respiratory:  Positive for cough. Negative for shortness of breath.    Cardiovascular:  Negative for chest pain.   Gastrointestinal:  Negative for abdominal pain.   Allergic/Immunologic: Positive for environmental allergies.   Neurological:  Negative for dizziness.   Hematological:  Positive for adenopathy.     Current Medications     Current Medications[1]    Current Allergies     Allergies as of 05/24/2025 - Reviewed 05/24/2025   Allergen Reaction Noted    Pollen extract Eye Swelling 05/24/2025            The following portions of the patient's history were reviewed and updated as appropriate: allergies, current medications, past family history, past medical history, past social history, past surgical history and problem list.     Past Medical History[2]    Past Surgical History[3]    Family History[4]      Medications have been verified.        Objective   BP (!) 190/100   Pulse 84   Temp (!) 97.3 °F (36.3 °C) (Tympanic)   Resp 17   SpO2 99%   No LMP recorded. Patient has had an implant.       Physical Exam     Physical Exam  Vitals and nursing note reviewed.   Constitutional:       General: She is in acute distress.      Appearance: Normal appearance. She is not ill-appearing, toxic-appearing or diaphoretic.   HENT:      Head: Normocephalic and atraumatic.      Right Ear: Tympanic membrane, ear canal and external ear normal. There is no impacted cerumen.      Left Ear: Ear canal and external ear normal. There is no impacted cerumen.      Ears:      Comments: Ruptured inflamed left TM.  No purulence noted.     Nose: Congestion and rhinorrhea present.      Comments: Inflamed nasal mucosa      Mouth/Throat:      Mouth: Mucous membranes are moist.      Pharynx: No posterior oropharyngeal erythema.     Eyes:      General:         Right eye: No discharge.         Left eye: No discharge.      Conjunctiva/sclera: Conjunctivae normal.       Cardiovascular:      Rate and Rhythm: Normal rate and regular rhythm.   Pulmonary:      Effort: Pulmonary effort is normal. No respiratory distress.      Breath sounds: Normal breath sounds. No wheezing, rhonchi or rales.     Skin:     General: Skin is warm.      Findings: No erythema.     Neurological:      General: No focal deficit present.      Mental Status: She is alert and oriented to person, place, and time.     Psychiatric:         Mood and Affect: Mood normal.         Behavior: Behavior normal.         Thought Content: Thought content normal.         Judgment: Judgment normal.                        [1]   Current Outpatient Medications:     amoxicillin-clavulanate (AUGMENTIN) 875-125 mg per tablet, Take 1 tablet by mouth every 12 (twelve) hours for 5 days, Disp: 10 tablet, Rfl: 0    Cholecalciferol (VITAMIN D3) 1,000 units tablet, Take 1 tablet (1,000 Units total) by mouth daily, Disp: 30 tablet, Rfl: 1    fluticasone (FLONASE) 50 mcg/act nasal spray, 1 spray into each nostril 2 (two) times a day, Disp: 16 g, Rfl: 0    lisinopril (ZESTRIL) 20 mg tablet, Take 1 tablet (20 mg total) by mouth daily, Disp: 30 tablet, Rfl: 0    Current Facility-Administered Medications:     etonogestrel (NEXPLANON) subdermal implant 68 mg, 68 mg, Subdermal, Once every 3 years, , 68 mg at 11/26/24 1230  [2]   Past Medical History:  Diagnosis Date    Abnormal Pap smear of cervix 2017    Miscarriage 10/2013    Urogenital trichomoniasis 4/2014   [3] No past surgical history on file.  [4]   Family History  Problem Relation Name Age of Onset    Hypertension Mother Amber     Osteopenia Mother Amber     Hypertension Father Per     Hyperlipidemia Father Per     Lung cancer Father  Per     Hypertension Sister gloria     Hypertension Sister margarette     Hypertension Maternal Grandmother Natalia     Hyperlipidemia Maternal Grandmother Natalia     Diabetes Maternal Grandmother Natalia     Diabetes Maternal Grandfather Vargas     No Known Problems Paternal Grandmother      Diabetes Paternal Grandfather Per     Hyperlipidemia Paternal Grandfather Per     Breast cancer Maternal Aunt  38    Colon cancer Maternal Aunt  60    No Known Problems Maternal Aunt      No Known Problems Maternal Aunt      No Known Problems Paternal Aunt      Breast cancer Cousin maternal         doesn't know the age

## 2025-05-27 ENCOUNTER — APPOINTMENT (OUTPATIENT)
Dept: LAB | Facility: HOSPITAL | Age: 45
End: 2025-05-27
Attending: FAMILY MEDICINE

## 2025-05-27 ENCOUNTER — OFFICE VISIT (OUTPATIENT)
Dept: FAMILY MEDICINE CLINIC | Facility: CLINIC | Age: 45
End: 2025-05-27
Payer: COMMERCIAL

## 2025-05-27 VITALS
SYSTOLIC BLOOD PRESSURE: 170 MMHG | DIASTOLIC BLOOD PRESSURE: 110 MMHG | TEMPERATURE: 97.9 F | WEIGHT: 169 LBS | OXYGEN SATURATION: 98 % | HEART RATE: 100 BPM | HEIGHT: 61 IN | RESPIRATION RATE: 18 BRPM | BODY MASS INDEX: 31.91 KG/M2

## 2025-05-27 DIAGNOSIS — Z11.59 NEED FOR HEPATITIS C SCREENING TEST: ICD-10-CM

## 2025-05-27 DIAGNOSIS — Z13.89 SCREENING FOR CARDIOVASCULAR, RESPIRATORY, AND GENITOURINARY DISEASES: ICD-10-CM

## 2025-05-27 DIAGNOSIS — Z13.21 ENCOUNTER FOR VITAMIN DEFICIENCY SCREENING: ICD-10-CM

## 2025-05-27 DIAGNOSIS — I10 ESSENTIAL HYPERTENSION: Primary | ICD-10-CM

## 2025-05-27 DIAGNOSIS — Z13.83 SCREENING FOR CARDIOVASCULAR, RESPIRATORY, AND GENITOURINARY DISEASES: ICD-10-CM

## 2025-05-27 DIAGNOSIS — Z13.6 SCREENING FOR CARDIOVASCULAR, RESPIRATORY, AND GENITOURINARY DISEASES: ICD-10-CM

## 2025-05-27 DIAGNOSIS — Z13.0 SCREENING FOR DEFICIENCY ANEMIA: ICD-10-CM

## 2025-05-27 DIAGNOSIS — Z13.29 SCREENING FOR THYROID DISORDER: ICD-10-CM

## 2025-05-27 DIAGNOSIS — Z11.4 SCREENING FOR HIV (HUMAN IMMUNODEFICIENCY VIRUS): ICD-10-CM

## 2025-05-27 PROCEDURE — 99214 OFFICE O/P EST MOD 30 MIN: CPT | Performed by: FAMILY MEDICINE

## 2025-05-27 RX ORDER — LISINOPRIL 20 MG/1
40 TABLET ORAL DAILY
Qty: 60 TABLET | Refills: 0 | Status: SHIPPED | OUTPATIENT
Start: 2025-05-27 | End: 2025-06-03 | Stop reason: DRUGHIGH

## 2025-05-27 NOTE — ASSESSMENT & PLAN NOTE
Uncontrolled. Will increase dose of lisinopril from 20mg to 40mg daily. Follow up in 1 week for BP check.   Orders:    lisinopril (ZESTRIL) 20 mg tablet; Take 2 tablets (40 mg total) by mouth daily

## 2025-05-27 NOTE — PROGRESS NOTES
Name: Nabila Hankins      : 1980      MRN: [id number]  Encounter Provider: Julia Winslow MD  Encounter Date: 2025   Encounter department: [facility]  :  Assessment & Plan  Essential hypertension  Uncontrolled. Will increase dose of lisinopril from 20mg to 40mg daily. Follow up in 1 week for BP check.   Orders:    lisinopril (ZESTRIL) 20 mg tablet; Take 2 tablets (40 mg total) by mouth daily    Screening for deficiency anemia    Orders:    CBC and differential; Future    Screening for cardiovascular, respiratory, and genitourinary diseases    Orders:    Comprehensive metabolic panel; Future    Lipid Panel with Direct LDL reflex; Future    Screening for thyroid disorder    Orders:    TSH, 3rd generation with Free T4 reflex; Future    Need for hepatitis C screening test    Orders:    Hepatitis C Antibody; Future    Screening for HIV (human immunodeficiency virus)    Orders:    HIV 1/2 Antigen/Antibody (Fourth Generation) with Reflex Testing ([facility], [facility], or EXTERNAL LAB); Future    Encounter for vitamin deficiency screening    Orders:    Vitamin D 25 hydroxy; Future           History of Present Illness   Hypertension  This is a chronic problem. The current episode started more than 1 year ago. The problem has been gradually worsening since onset. The problem is uncontrolled. Pertinent negatives include no anxiety, blurred vision, chest pain, headaches, malaise/fatigue, neck pain, orthopnea, palpitations, peripheral edema, PND, shortness of breath or sweats. Risk factors for coronary artery disease include obesity. Past treatments include ACE inhibitors. There are no compliance problems.        Review of Systems   Constitutional:  Negative for malaise/fatigue.   Eyes:  Negative for blurred vision.   Respiratory:  Negative for shortness of breath.    Cardiovascular:  Negative for chest pain, palpitations, orthopnea and PND.   Musculoskeletal:  Negative for neck pain.   Neurological:  Negative  "for headaches.       Objective   BP (!) 170/110   Pulse 100   Temp 97.9 °F (36.6 °C)   Resp 18   Ht 5' 1\" (1.549 m)   Wt 76.7 kg (169 lb)   SpO2 98%   BMI 31.93 kg/m²      Physical Exam  Constitutional:       General: She is not in acute distress.     Appearance: She is well-developed. She is not diaphoretic.   HENT:      Head: Normocephalic and atraumatic.     Cardiovascular:      Rate and Rhythm: Normal rate and regular rhythm.      Heart sounds: Normal heart sounds. No murmur heard.     No friction rub. No gallop.   Pulmonary:      Effort: Pulmonary effort is normal. No respiratory distress.      Breath sounds: Normal breath sounds. No wheezing or rales.   Chest:      Chest wall: No tenderness.     Musculoskeletal:         General: No deformity. Normal range of motion.      Cervical back: Normal range of motion and neck supple.     Skin:     General: Skin is warm and dry.     Neurological:      Mental Status: She is alert and oriented to person, place, and time.     Psychiatric:         Behavior: Behavior normal.         Thought Content: Thought content normal.         Judgment: Judgment normal.         "

## 2025-06-01 LAB
25(OH)D3 SERPL-MCNC: 15 NG/ML (ref 30–100)
ALBUMIN SERPL-MCNC: 4 G/DL (ref 3.6–5.1)
ALBUMIN/GLOB SERPL: 1.4 (CALC) (ref 1–2.5)
ALP SERPL-CCNC: 74 U/L (ref 31–125)
ALT SERPL-CCNC: 26 U/L (ref 6–29)
AST SERPL-CCNC: 18 U/L (ref 10–35)
BASOPHILS # BLD AUTO: 29 CELLS/UL (ref 0–200)
BASOPHILS NFR BLD AUTO: 0.4 %
BILIRUB SERPL-MCNC: 0.5 MG/DL (ref 0.2–1.2)
BUN SERPL-MCNC: 11 MG/DL (ref 7–25)
BUN/CREAT SERPL: ABNORMAL (CALC) (ref 6–22)
CALCIUM SERPL-MCNC: 9 MG/DL (ref 8.6–10.2)
CHLORIDE SERPL-SCNC: 104 MMOL/L (ref 98–110)
CHOLEST SERPL-MCNC: 183 MG/DL
CHOLEST/HDLC SERPL: 4.7 (CALC)
CO2 SERPL-SCNC: 28 MMOL/L (ref 20–32)
CREAT SERPL-MCNC: 0.71 MG/DL (ref 0.5–0.99)
EOSINOPHIL # BLD AUTO: 51 CELLS/UL (ref 15–500)
EOSINOPHIL NFR BLD AUTO: 0.7 %
ERYTHROCYTE [DISTWIDTH] IN BLOOD BY AUTOMATED COUNT: 13 % (ref 11–15)
GFR/BSA.PRED SERPLBLD CYS-BASED-ARV: 106 ML/MIN/1.73M2
GLOBULIN SER CALC-MCNC: 2.8 G/DL (CALC) (ref 1.9–3.7)
GLUCOSE SERPL-MCNC: 91 MG/DL (ref 65–99)
HCT VFR BLD AUTO: 42.7 % (ref 35–45)
HCV AB SERPL QL IA: NORMAL
HDLC SERPL-MCNC: 39 MG/DL
HGB BLD-MCNC: 13.6 G/DL (ref 11.7–15.5)
HIV 1+2 AB+HIV1 P24 AG SERPL QL IA: NORMAL
LDLC SERPL CALC-MCNC: 120 MG/DL (CALC)
LYMPHOCYTES # BLD AUTO: 2343 CELLS/UL (ref 850–3900)
LYMPHOCYTES NFR BLD AUTO: 32.1 %
MCH RBC QN AUTO: 29.7 PG (ref 27–33)
MCHC RBC AUTO-ENTMCNC: 31.9 G/DL (ref 32–36)
MCV RBC AUTO: 93.2 FL (ref 80–100)
MONOCYTES # BLD AUTO: 372 CELLS/UL (ref 200–950)
MONOCYTES NFR BLD AUTO: 5.1 %
NEUTROPHILS # BLD AUTO: 4504 CELLS/UL (ref 1500–7800)
NEUTROPHILS NFR BLD AUTO: 61.7 %
NONHDLC SERPL-MCNC: 144 MG/DL (CALC)
PLATELET # BLD AUTO: 369 THOUSAND/UL (ref 140–400)
PMV BLD REES-ECKER: 9 FL (ref 7.5–12.5)
POTASSIUM SERPL-SCNC: 3.4 MMOL/L (ref 3.5–5.3)
PROT SERPL-MCNC: 6.8 G/DL (ref 6.1–8.1)
RBC # BLD AUTO: 4.58 MILLION/UL (ref 3.8–5.1)
SODIUM SERPL-SCNC: 140 MMOL/L (ref 135–146)
TRIGL SERPL-MCNC: 129 MG/DL
TSH SERPL-ACNC: 0.92 MIU/L
WBC # BLD AUTO: 7.3 THOUSAND/UL (ref 3.8–10.8)

## 2025-06-03 ENCOUNTER — OFFICE VISIT (OUTPATIENT)
Dept: FAMILY MEDICINE CLINIC | Facility: CLINIC | Age: 45
End: 2025-06-03
Payer: COMMERCIAL

## 2025-06-03 VITALS
DIASTOLIC BLOOD PRESSURE: 100 MMHG | RESPIRATION RATE: 18 BRPM | HEIGHT: 61 IN | HEART RATE: 82 BPM | TEMPERATURE: 97.6 F | OXYGEN SATURATION: 100 % | SYSTOLIC BLOOD PRESSURE: 140 MMHG | WEIGHT: 168 LBS | BODY MASS INDEX: 31.72 KG/M2

## 2025-06-03 DIAGNOSIS — I10 ESSENTIAL HYPERTENSION: Primary | ICD-10-CM

## 2025-06-03 DIAGNOSIS — E55.9 VITAMIN D DEFICIENCY: ICD-10-CM

## 2025-06-03 PROCEDURE — 99214 OFFICE O/P EST MOD 30 MIN: CPT | Performed by: FAMILY MEDICINE

## 2025-06-03 RX ORDER — ERGOCALCIFEROL 1.25 MG/1
50000 CAPSULE, LIQUID FILLED ORAL WEEKLY
Qty: 12 CAPSULE | Refills: 0 | Status: SHIPPED | OUTPATIENT
Start: 2025-06-03

## 2025-06-03 RX ORDER — LISINOPRIL 40 MG/1
40 TABLET ORAL DAILY
Qty: 90 TABLET | Refills: 1 | Status: SHIPPED | OUTPATIENT
Start: 2025-06-03

## 2025-06-03 RX ORDER — AMLODIPINE BESYLATE 5 MG/1
5 TABLET ORAL DAILY
Qty: 90 TABLET | Refills: 1 | Status: SHIPPED | OUTPATIENT
Start: 2025-06-03

## 2025-06-03 NOTE — PROGRESS NOTES
"Name: Nabila Hankins      : 1980      MRN: [id number]  Encounter Provider: Julia Winslow MD  Encounter Date: 6/3/2025   Encounter department: [facility]  :  Assessment & Plan  Essential hypertension  Improving, but not well controlled on higher dose of lisinopril. Will add amlodipine. Follow up in 1 month.   Orders:    amLODIPine (NORVASC) 5 mg tablet; Take 1 tablet (5 mg total) by mouth daily    lisinopril (ZESTRIL) 40 mg tablet; Take 1 tablet (40 mg total) by mouth daily    Vitamin D deficiency    Orders:    ergocalciferol (VITAMIN D2) 50,000 units; Take 1 capsule (50,000 Units total) by mouth once a week           History of Present Illness   Hypertension  This is a chronic problem. The problem has been gradually improving since onset. The problem is uncontrolled. Pertinent negatives include no anxiety, blurred vision, chest pain, headaches, malaise/fatigue, neck pain, orthopnea, palpitations, peripheral edema, PND, shortness of breath or sweats. Past treatments include ACE inhibitors. The current treatment provides moderate improvement. There are no compliance problems.        Review of Systems   Constitutional:  Negative for malaise/fatigue.   Eyes:  Negative for blurred vision.   Respiratory:  Negative for shortness of breath.    Cardiovascular:  Negative for chest pain, palpitations, orthopnea and PND.   Musculoskeletal:  Negative for neck pain.   Neurological:  Negative for headaches.       Objective   /100   Pulse 82   Temp 97.6 °F (36.4 °C)   Resp 18   Ht 5' 1\" (1.549 m)   Wt 76.2 kg (168 lb)   SpO2 100%   BMI 31.74 kg/m²      Physical Exam  Constitutional:       General: She is not in acute distress.     Appearance: She is well-developed. She is not diaphoretic.   HENT:      Head: Normocephalic and atraumatic.     Cardiovascular:      Rate and Rhythm: Normal rate and regular rhythm.      Heart sounds: Normal heart sounds. No murmur heard.     No friction rub. No gallop. "   Pulmonary:      Effort: Pulmonary effort is normal. No respiratory distress.      Breath sounds: Normal breath sounds. No wheezing or rales.   Chest:      Chest wall: No tenderness.     Musculoskeletal:         General: No deformity. Normal range of motion.      Cervical back: Normal range of motion and neck supple.     Skin:     General: Skin is warm and dry.     Neurological:      Mental Status: She is alert and oriented to person, place, and time.     Psychiatric:         Behavior: Behavior normal.         Thought Content: Thought content normal.         Judgment: Judgment normal.

## 2025-06-03 NOTE — ASSESSMENT & PLAN NOTE
Improving, but not well controlled on higher dose of lisinopril. Will add amlodipine. Follow up in 1 month.   Orders:    amLODIPine (NORVASC) 5 mg tablet; Take 1 tablet (5 mg total) by mouth daily    lisinopril (ZESTRIL) 40 mg tablet; Take 1 tablet (40 mg total) by mouth daily

## 2025-07-07 ENCOUNTER — OFFICE VISIT (OUTPATIENT)
Dept: FAMILY MEDICINE CLINIC | Facility: CLINIC | Age: 45
End: 2025-07-07
Payer: COMMERCIAL

## 2025-07-07 VITALS
BODY MASS INDEX: 31.72 KG/M2 | WEIGHT: 168 LBS | TEMPERATURE: 98.2 F | HEART RATE: 100 BPM | RESPIRATION RATE: 16 BRPM | DIASTOLIC BLOOD PRESSURE: 84 MMHG | SYSTOLIC BLOOD PRESSURE: 144 MMHG | HEIGHT: 61 IN

## 2025-07-07 DIAGNOSIS — I10 ESSENTIAL HYPERTENSION: Primary | ICD-10-CM

## 2025-07-07 PROCEDURE — 99213 OFFICE O/P EST LOW 20 MIN: CPT | Performed by: FAMILY MEDICINE

## 2025-07-07 RX ORDER — AMLODIPINE BESYLATE 10 MG/1
10 TABLET ORAL DAILY
Qty: 90 TABLET | Refills: 0 | Status: SHIPPED | OUTPATIENT
Start: 2025-07-07

## 2025-07-07 NOTE — ASSESSMENT & PLAN NOTE
Improving, but not fully controlled. Will increase dose of amlodipine from 5mg to 10mg daily. Continue lisinopril.   Orders:    amLODIPine (NORVASC) 10 mg tablet; Take 1 tablet (10 mg total) by mouth daily

## 2025-07-07 NOTE — PROGRESS NOTES
"Name: Nabila Hankins      : 1980      MRN: [id number]  Encounter Provider: Julia Winslow MD  Encounter Date: 2025   Encounter department: [facility]  :  Assessment & Plan  Essential hypertension  Improving, but not fully controlled. Will increase dose of amlodipine from 5mg to 10mg daily. Continue lisinopril.   Orders:    amLODIPine (NORVASC) 10 mg tablet; Take 1 tablet (10 mg total) by mouth daily           History of Present Illness   Hypertension  The problem has been gradually improving since onset. The problem is uncontrolled. Pertinent negatives include no anxiety, blurred vision, chest pain, headaches, malaise/fatigue, neck pain, orthopnea, palpitations, peripheral edema, PND, shortness of breath or sweats. Past treatments include calcium channel blockers and ACE inhibitors. The current treatment provides moderate improvement.       Review of Systems   Constitutional:  Negative for malaise/fatigue.   Eyes:  Negative for blurred vision.   Respiratory:  Negative for shortness of breath.    Cardiovascular:  Negative for chest pain, palpitations, orthopnea and PND.   Musculoskeletal:  Negative for neck pain.   Neurological:  Negative for headaches.       Objective   /84   Pulse 100   Temp 98.2 °F (36.8 °C)   Resp 16   Ht 5' 1\" (1.549 m)   Wt 76.2 kg (168 lb)   BMI 31.74 kg/m²      Physical Exam  Constitutional:       General: She is not in acute distress.     Appearance: She is well-developed. She is not diaphoretic.   HENT:      Head: Normocephalic and atraumatic.     Cardiovascular:      Rate and Rhythm: Normal rate and regular rhythm.      Heart sounds: Normal heart sounds. No murmur heard.     No friction rub. No gallop.   Pulmonary:      Effort: Pulmonary effort is normal. No respiratory distress.      Breath sounds: Normal breath sounds. No wheezing or rales.   Chest:      Chest wall: No tenderness.     Musculoskeletal:         General: No deformity. Normal range of " motion.      Cervical back: Normal range of motion and neck supple.     Skin:     General: Skin is warm and dry.     Neurological:      Mental Status: She is alert and oriented to person, place, and time.     Psychiatric:         Behavior: Behavior normal.         Thought Content: Thought content normal.         Judgment: Judgment normal.